# Patient Record
Sex: FEMALE | Race: WHITE | Employment: FULL TIME | URBAN - METROPOLITAN AREA
[De-identification: names, ages, dates, MRNs, and addresses within clinical notes are randomized per-mention and may not be internally consistent; named-entity substitution may affect disease eponyms.]

---

## 2017-07-19 PROBLEM — I47.1 PSVT (PAROXYSMAL SUPRAVENTRICULAR TACHYCARDIA) (HCC): Status: ACTIVE | Noted: 2017-07-19

## 2017-07-19 PROBLEM — N20.0 KIDNEY STONES: Status: ACTIVE | Noted: 2017-07-19

## 2017-07-19 PROBLEM — D44.7 PARAGANGLIOMA (HCC): Status: ACTIVE | Noted: 2017-07-19

## 2017-07-19 PROBLEM — F32.A DEPRESSION: Status: ACTIVE | Noted: 2017-07-19

## 2017-07-19 PROBLEM — J32.9 CHRONIC SINUSITIS: Status: ACTIVE | Noted: 2017-07-19

## 2017-10-02 ENCOUNTER — OFFICE VISIT (OUTPATIENT)
Dept: SURGERY | Age: 57
End: 2017-10-02

## 2017-10-02 VITALS
BODY MASS INDEX: 20.61 KG/M2 | OXYGEN SATURATION: 98 % | HEART RATE: 84 BPM | TEMPERATURE: 98.4 F | DIASTOLIC BLOOD PRESSURE: 80 MMHG | SYSTOLIC BLOOD PRESSURE: 128 MMHG | RESPIRATION RATE: 18 BRPM | WEIGHT: 136 LBS | HEIGHT: 68 IN

## 2017-10-02 DIAGNOSIS — K43.2 INCISIONAL HERNIA, WITHOUT OBSTRUCTION OR GANGRENE: Primary | ICD-10-CM

## 2017-10-02 RX ORDER — LORAZEPAM 0.5 MG/1
0.5 TABLET ORAL
COMMUNITY

## 2017-10-02 RX ORDER — VALACYCLOVIR HYDROCHLORIDE 500 MG/1
500 TABLET, FILM COATED ORAL DAILY
COMMUNITY

## 2017-10-02 RX ORDER — HYDROXYCHLOROQUINE SULFATE 200 MG/1
200 TABLET, FILM COATED ORAL 2 TIMES DAILY
COMMUNITY

## 2017-10-02 NOTE — MR AVS SNAPSHOT
Visit Information Date & Time Provider Department Dept. Phone Encounter #  
 10/2/2017  3:00 PM Danyelle Pari, [address] [phone] [id number] Follow-up Instructions Routing History Upcoming Health Maintenance Date Due Hepatitis C Screening 1960 Pneumococcal 19-64 Medium Risk (1 of 1 - PPSV23) 3/11/1980 DTaP/Tdap/Td series (1 - Tdap) 3/11/1982 PAP AKA CERVICAL CYTOLOGY 3/11/1982 FOBT Q 1 YEAR AGE 50-75 3/11/2011 INFLUENZA AGE 9 TO ADULT 8/1/2017 BREAST CANCER SCRN MAMMOGRAM 7/19/2018 Allergies as of 10/2/2017  Review Complete On: 10/2/2017 By: Danyelle Yañez MD  
  
 Severity Noted Reaction Type Reactions Amoxicillin  01/13/2012    Hives Ceftin [Cefuroxime Axetil]  01/13/2012    Rash Erythromycin  01/13/2012    Rash Keflex [Cephalexin]  01/13/2012    Hives Sulfa (Sulfonamide Antibiotics)  01/13/2012    Hives Current Immunizations  Never Reviewed No immunizations on file. Not reviewed this visit You Were Diagnosed With   
  
 Codes Comments Incisional hernia, without obstruction or gangrene    -  Primary ICD-10-CM: B34.1 ICD-9-CM: 553.21 Vitals BP Pulse Temp Resp Height(growth percentile) Weight(growth percentile) 128/80 (BP 1 Location: Right arm, BP Patient Position: Sitting) 84 98.4 °F (36.9 °C) (Oral) 18 5' 8\" (1.727 m) 136 lb (61.7 kg) LMP SpO2 BMI OB Status Smoking Status 01/13/2010 98% 20.68 kg/m2 Postmenopausal Current Every Day Smoker BMI and BSA Data Body Mass Index Body Surface Area  
 20.68 kg/m 2 1.72 m 2 Preferred Pharmacy Pharmacy Name Phone [facility] [phone] Your Updated Medication List  
  
   
This list is accurate as of: 10/2/17  4:46 PM.  Always use your most recent med list.  
  
  
  
  
 ACIDOPHILUS PO  
 Take  by mouth daily. aspirin delayed-release 81 mg tablet Take  by mouth daily. BYSTOLIC 2.5 mg tablet Generic drug:  nebivolol Take  by mouth daily. docusate sodium 100 mg capsule Commonly known as:  Lucetta Arias Take 100 mg by mouth every other day. doxycycline 100 mg capsule Commonly known as:  VIBRAMYCIN Take 1 Cap by mouth two (2) times a day. FISH -1,400 mg Cpdr  
Generic drug:  omega 3-dha-epa-fish oil Take 1,400 mg by mouth two (2) times a day. FLONASE 50 mcg/actuation nasal spray Generic drug:  fluticasone 2 Sprays by Both Nostrils route daily. levoFLOXacin 500 mg tablet Commonly known as:  Fernanda Motley Take 1 Tab by mouth daily. loratadine 10 mg tablet Commonly known as:  Ana Laura Draft Take 10 mg by mouth. LORazepam 0.5 mg tablet Commonly known as:  ATIVAN Take  by mouth.  
  
 magnesium oxide 400 mg tablet Commonly known as:  MAG-OX Take 400 mg by mouth daily. omeprazole 20 mg capsule Commonly known as:  PRILOSEC Take 20 mg by mouth daily. PLAQUENIL 200 mg tablet Generic drug:  hydroxychloroquine Take 200 mg by mouth daily. turmeric root extract 500 mg Cap Take  by mouth two (2) times a day. VALTREX 500 mg tablet Generic drug:  valACYclovir Take  by mouth two (2) times a day. VITAMIN D3 2,000 unit Tab Generic drug:  cholecalciferol (vitamin D3) Take 2,000 Units by mouth daily. XANAX PO Take 0.5 mg by mouth daily as needed. XYZAL 5 mg tablet Generic drug:  levocetirizine Take 5 mg by mouth every evening. Introducing \[facility]\"" & HEALTH SERVICES! Dear Katina Alonzo: 
Thank you for requesting a B-152 account. Our records indicate that you already have an active B-152 account. You can access your account anytime at [email or web address] Did you know that you can access your hospital and ER discharge instructions at any time in [employer]? You can also review all of your test results from your hospital stay or ER visit. Additional Information If you have questions, please visit the Frequently Asked Questions section of the [employer] website at [email or web address] Remember, [employer] is NOT to be used for urgent needs. For medical emergencies, dial 911. Now available from your iPhone and Android! Please provide this summary of care documentation to your next provider. Your primary care clinician is listed as Lizeth Acosta. If you have any questions after today's visit, please call [phone].

## 2017-10-02 NOTE — PROGRESS NOTES
1. Have you been to the ER, urgent care clinic since your last visit? Hospitalized since your last visit? No    2. Have you seen or consulted any other health care providers outside of the [address] [place] [address] since your last visit? Include any pap smears or colon screening.  Yes- Dr. Cristian Rosa who did surgery recently to remove tumor and lymph nodes from abdomen

## 2017-10-02 NOTE — PROGRESS NOTES
Surgery Consult    Subjective:      Dionte Pearson is a 65 y.o.  female who was referred by Dr Tamica Benoit for evaluation of possible hernia and surgery to remove mass. Pt is unsure as to what is going on right now. Pt thought she had a recurrent hernia which she doesn't since she has never had a hernia before. Report also said (according to ) that there was mesh present as well--which is impossible since she has never had surgery before. She was tested for genetic disorders and was positive for [facility]. Pt had a multitude of symptoms, during her recovery she was doing yard work and got lyme disease. Then she had achy joints, twitching of the face--went to a number of doctors for it. The stomach never stopped hurting, even now--although now it can be ascribed to the hernia. She went to the original surgeon last week to discuss her numerous allergies so she doesn't want the mesh but she definitely wants something to reinforce and prevent a recurrence. She mentioned that pig skin/cadaver skin is being used up at [facility].       Chief Complaint   Patient presents with    Incisional Hernia     'below belly button'       Patient Active Problem List    Diagnosis Date Noted    Incisional hernia, without obstruction or gangrene 10/02/2017    PSVT (paroxysmal supraventricular tachycardia) ([address] 07/19/2017    Chronic sinusitis 07/19/2017    Kidney stones 07/19/2017    Depression 07/19/2017    Paraganglioma ([address] 07/19/2017     Past Medical History:   Diagnosis Date    Chronic sinusitis 7/19/2017    Depression 7/19/2017    Hypercholesterolemia     Ill-defined condition     hematuria    Ill-defined condition     allergic rhinittis    Ill-defined condition     recurrent UTIs    Incisional hernia, without obstruction or gangrene 10/2/2017    Kidney stones 7/19/2017    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    [facility] MUTATION    PSVT (paroxysmal supraventricular tachycardia) ([address] 7/19/2017    Psychotic disorder anxiety    Unspecified sleep apnea     mouth guard      Past Surgical History:   Procedure Laterality Date    ABDOMEN SURGERY PROC UNLISTED  1998    benign tumor in stomach    HX CHOLECYSTECTOMY      HX HEENT  1999    sinus    HX HEENT      widom teeth      Social History   Substance Use Topics    Smoking status: Current Every Day Smoker     Packs/day: 1.00     Years: 30.00    Smokeless tobacco: Never Used    Alcohol use 2.5 oz/week     5 Glasses of wine per week      Family History   Problem Relation Age of Onset    Elevated Lipids Sister     Breast Cancer Sister 52    Cancer Sister 48     breast cancer    Elevated Lipids Sister     Cancer Sister 50     Breast cancer    Elevated Lipids Sister     Elevated Lipids Mother     Cancer Father      bladder, prostate    Diabetes Father     Elevated Lipids Sister     Elevated Lipids Sister     Breast Cancer Maternal Aunt      had it twice last 79    Breast Cancer Paternal Aunt       Current Outpatient Prescriptions   Medication Sig    hydroxychloroquine (PLAQUENIL) 200 mg tablet Take 200 mg by mouth daily.  valACYclovir (VALTREX) 500 mg tablet Take  by mouth two (2) times a day.  LORazepam (ATIVAN) 0.5 mg tablet Take  by mouth.  LACTOBACILLUS ACIDOPHILUS (ACIDOPHILUS PO) Take  by mouth daily.  cholecalciferol, vitamin D3, (VITAMIN D3) 2,000 unit tab Take 2,000 Units by mouth daily.  loratadine (CLARITIN) 10 mg tablet Take 10 mg by mouth.  fluticasone (FLONASE) 50 mcg/actuation nasal spray 2 Sprays by Both Nostrils route daily.  aspirin delayed-release 81 mg tablet Take  by mouth daily.  doxycycline (VIBRAMYCIN) 100 mg capsule Take 1 Cap by mouth two (2) times a day.  nebivolol (BYSTOLIC) 2.5 mg tablet Take  by mouth daily.  omega 3-dha-epa-fish oil (FISH OIL) 900-1,400 mg cpDR Take 1,400 mg by mouth two (2) times a day.  magnesium oxide (MAG-OX) 400 mg tablet Take 400 mg by mouth daily.     docusate sodium (COLACE) 100 mg capsule Take 100 mg by mouth every other day.  turmeric root extract 500 mg cap Take  by mouth two (2) times a day.  omeprazole (PRILOSEC) 20 mg capsule Take 20 mg by mouth daily.  levoFLOXacin (LEVAQUIN) 500 mg tablet Take 1 Tab by mouth daily.  levocetirizine (XYZAL) 5 mg tablet Take 5 mg by mouth every evening.  ALPRAZOLAM (XANAX PO) Take 0.5 mg by mouth daily as needed. No current facility-administered medications for this visit. Allergies   Allergen Reactions    Amoxicillin Hives    Ceftin [Cefuroxime Axetil] Rash    Erythromycin Rash    Keflex [Cephalexin] Hives    Sulfa (Sulfonamide Antibiotics) Hives        Review of Systems:    A comprehensive review of systems was negative except for that written in the History of Present Illness. Objective:     Visit Vitals    /80 (BP 1 Location: Right arm, BP Patient Position: Sitting)    Pulse 84    Temp 98.4 °F (36.9 °C) (Oral)    Resp 18    Ht 5' 8\" (1.727 m)    Wt 136 lb (61.7 kg)    LMP 01/13/2010    SpO2 98%    BMI 20.68 kg/m2         Physical Exam:  General appearance: alert, cooperative, no distress, appears stated age  Head: Normocephalic, without obvious abnormality, atraumatic  Lungs: clear to auscultation bilaterally  Heart: regular rate and rhythm, S1, S2 normal, no murmur, click, rub or gallop  Neurologic: Grossly normal  Abdomen: Has a 3 cm defect just to the right of the umbilicus from her midline incision. There are no masses. No organomegaly. Assessment:       ICD-10-CM ICD-9-CM    1. Incisional hernia, without obstruction or gangrene K43.2 553.21          Plan:     Repair hernia using absorbable mesh as an outpatient. Written by marcelino Dejesus, as dictated by Karina Tripathi MD.    Total face to face time with patient: 25 minutes. Greater than 50% of the time was spent in counseling. Signed By: Karina Tripathi MD     October 2, 2017

## 2017-10-12 ENCOUNTER — HOSPITAL ENCOUNTER (OUTPATIENT)
Dept: PREADMISSION TESTING | Age: 57
Discharge: HOME | End: 2017-10-12
Payer: COMMERCIAL

## 2017-10-12 ENCOUNTER — DOCUMENTATION ONLY (OUTPATIENT)
Dept: SURGERY | Age: 57
End: 2017-10-12

## 2017-10-12 ENCOUNTER — TELEPHONE (OUTPATIENT)
Dept: SURGERY | Age: 57
End: 2017-10-12

## 2017-10-12 VITALS
BODY MASS INDEX: 5.32 KG/M2 | HEIGHT: 68 IN | RESPIRATION RATE: 18 BRPM | DIASTOLIC BLOOD PRESSURE: 82 MMHG | HEART RATE: 77 BPM | SYSTOLIC BLOOD PRESSURE: 121 MMHG | WEIGHT: 35.13 LBS | OXYGEN SATURATION: 99 % | TEMPERATURE: 98.1 F

## 2017-10-12 LAB
ANION GAP SERPL CALC-SCNC: 8 MMOL/L (ref 5–15)
BASOPHILS # BLD: 0.1 K/UL (ref 0–0.1)
BASOPHILS NFR BLD: 1 % (ref 0–1)
BUN SERPL-MCNC: 8 MG/DL (ref 6–20)
BUN/CREAT SERPL: 12 (ref 12–20)
CALCIUM SERPL-MCNC: 9.3 MG/DL (ref 8.5–10.1)
CHLORIDE SERPL-SCNC: 107 MMOL/L (ref 97–108)
CO2 SERPL-SCNC: 28 MMOL/L (ref 21–32)
CREAT SERPL-MCNC: 0.68 MG/DL (ref 0.55–1.02)
EOSINOPHIL # BLD: 0.2 K/UL (ref 0–0.4)
EOSINOPHIL NFR BLD: 3 % (ref 0–7)
ERYTHROCYTE [DISTWIDTH] IN BLOOD BY AUTOMATED COUNT: 14.6 % (ref 11.5–14.5)
GLUCOSE SERPL-MCNC: 120 MG/DL (ref 65–100)
HCT VFR BLD AUTO: 39.7 % (ref 35–47)
HGB BLD-MCNC: 13.2 G/DL (ref 11.5–16)
LYMPHOCYTES # BLD: 1.3 K/UL (ref 0.8–3.5)
LYMPHOCYTES NFR BLD: 17 % (ref 12–49)
MCH RBC QN AUTO: 29.7 PG (ref 26–34)
MCHC RBC AUTO-ENTMCNC: 33.2 G/DL (ref 30–36.5)
MCV RBC AUTO: 89.4 FL (ref 80–99)
MONOCYTES # BLD: 0.6 K/UL (ref 0–1)
MONOCYTES NFR BLD: 8 % (ref 5–13)
NEUTS SEG # BLD: 5.2 K/UL (ref 1.8–8)
NEUTS SEG NFR BLD: 71 % (ref 32–75)
PLATELET # BLD AUTO: 266 K/UL (ref 150–400)
POTASSIUM SERPL-SCNC: 3.8 MMOL/L (ref 3.5–5.1)
RBC # BLD AUTO: 4.44 M/UL (ref 3.8–5.2)
SODIUM SERPL-SCNC: 143 MMOL/L (ref 136–145)
WBC # BLD AUTO: 7.3 K/UL (ref 3.6–11)

## 2017-10-12 PROCEDURE — 36415 COLL VENOUS BLD VENIPUNCTURE: CPT | Performed by: SURGERY

## 2017-10-12 PROCEDURE — 80048 BASIC METABOLIC PNL TOTAL CA: CPT | Performed by: SURGERY

## 2017-10-12 PROCEDURE — 85025 COMPLETE CBC W/AUTO DIFF WBC: CPT | Performed by: SURGERY

## 2017-10-12 RX ORDER — LANOLIN ALCOHOL/MO/W.PET/CERES
1000 CREAM (GRAM) TOPICAL DAILY
COMMUNITY

## 2017-10-12 RX ORDER — ASPIRIN 325 MG
TABLET, DELAYED RELEASE (ENTERIC COATED) ORAL
COMMUNITY

## 2017-10-12 RX ORDER — IBUPROFEN 200 MG
1 TABLET ORAL EVERY 24 HOURS
COMMUNITY

## 2017-10-12 RX ORDER — PRAVASTATIN SODIUM 40 MG/1
40 TABLET ORAL
COMMUNITY

## 2017-10-12 RX ORDER — FENOFIBRATE 160 MG/1
160 TABLET ORAL DAILY
COMMUNITY

## 2017-10-12 NOTE — PROGRESS NOTES
I spoke with Dr. Alexys Ernandez about patient asking about using mesh vs Pig Skin for her hernia repair. He said he was going to discuss it with some of the other physicians in the [address] and get back with me or the patient. I called the patient back and left a message letting her know this information.

## 2017-10-12 NOTE — TELEPHONE ENCOUNTER
gp: 10-19-17 repair inc. hernia w mesh    Patient was approved for mesh but wants to know if she can get pig skin instead.

## 2017-10-12 NOTE — TELEPHONE ENCOUNTER
Patient called back and said she was not sure she wanted to have the surgery if pig skin could not be used instead of mesh. She also states she saw her liver specialist Dr. Angel Cadena today and Dr. Angel Cadena was asking if Dr. Migue Inman could do a liver biopsy at the time of surgery because the one she had in Feb 'was not a good one'. I told her I would ask Dr. Migue Inman if he could call her to discuss these issues.

## 2017-10-13 ENCOUNTER — DOCUMENTATION ONLY (OUTPATIENT)
Dept: SURGERY | Age: 57
End: 2017-10-13

## 2017-10-13 ENCOUNTER — TELEPHONE (OUTPATIENT)
Dept: SURGERY | Age: 57
End: 2017-10-13

## 2017-10-13 NOTE — PROGRESS NOTES
EKG AND CARDIO NOTES FAXED  Received: Today       VENANCIO Roman LPN                     PLEASE NOTE:  FAXED EKG AND CARDIO NOTES. Esmer Amador       This information was received and I put it on Dr. Kym Whitfield despreeti in  for review.

## 2017-10-13 NOTE — TELEPHONE ENCOUNTER
I returned patients call and told that Dr. Szymanski Oscar allows his patients to continue their 81mg ASA prior to surgery.

## 2017-10-17 ENCOUNTER — TELEPHONE (OUTPATIENT)
Dept: SURGERY | Age: 57
End: 2017-10-17

## 2017-10-17 NOTE — TELEPHONE ENCOUNTER
Discussed the [place] mesh and she is in agreement with that. Also wants a liver biopsy to fully evaluate her elevated LFTs. Dr. Juanjose easton at [address] [place] [address] is desirous of seeing the slides. Will also use Exparel after the surgery to aid in pain relief.

## 2017-10-19 ENCOUNTER — HOSPITAL ENCOUNTER (INPATIENT)
Age: 57
LOS: 4 days | Discharge: HOME | DRG: 355 | End: 2017-10-23
Attending: SURGERY | Admitting: SURGERY
Payer: COMMERCIAL

## 2017-10-19 ENCOUNTER — ANESTHESIA EVENT (OUTPATIENT)
Dept: SURGERY | Age: 57
DRG: 355 | End: 2017-10-19
Payer: COMMERCIAL

## 2017-10-19 ENCOUNTER — ANESTHESIA (OUTPATIENT)
Dept: SURGERY | Age: 57
DRG: 355 | End: 2017-10-19
Payer: COMMERCIAL

## 2017-10-19 PROBLEM — K43.2 INCISIONAL HERNIA: Status: ACTIVE | Noted: 2017-10-19

## 2017-10-19 PROCEDURE — 77030020269 HC MISC IMPL: Performed by: SURGERY

## 2017-10-19 PROCEDURE — 74011250636 HC RX REV CODE- 250/636

## 2017-10-19 PROCEDURE — 0FB03ZX EXCISION OF LIVER, PERCUTANEOUS APPROACH, DIAGNOSTIC: ICD-10-PCS | Performed by: SURGERY

## 2017-10-19 PROCEDURE — 74011250636 HC RX REV CODE- 250/636: Performed by: SURGERY

## 2017-10-19 PROCEDURE — 0WUF0JZ SUPPLEMENT ABDOMINAL WALL WITH SYNTHETIC SUBSTITUTE, OPEN APPROACH: ICD-10-PCS | Performed by: SURGERY

## 2017-10-19 PROCEDURE — 88313 SPECIAL STAINS GROUP 2: CPT | Performed by: SURGERY

## 2017-10-19 PROCEDURE — 74011000250 HC RX REV CODE- 250

## 2017-10-19 PROCEDURE — 76060000035 HC ANESTHESIA 2 TO 2.5 HR: Performed by: SURGERY

## 2017-10-19 PROCEDURE — 77030034698 HC LIGASURE MRYLND OPN SEAL DIV COVD -F: Performed by: SURGERY

## 2017-10-19 PROCEDURE — 76210000017 HC OR PH I REC 1.5 TO 2 HR: Performed by: SURGERY

## 2017-10-19 PROCEDURE — 87205 SMEAR GRAM STAIN: CPT | Performed by: SURGERY

## 2017-10-19 PROCEDURE — 74011250636 HC RX REV CODE- 250/636: Performed by: ANESTHESIOLOGY

## 2017-10-19 PROCEDURE — 77030002933 HC SUT MCRYL J&J -A: Performed by: SURGERY

## 2017-10-19 PROCEDURE — C9290 INJ, BUPIVACAINE LIPOSOME: HCPCS | Performed by: SURGERY

## 2017-10-19 PROCEDURE — 74011250637 HC RX REV CODE- 250/637: Performed by: SURGERY

## 2017-10-19 PROCEDURE — 77030008684 HC TU ET CUF COVD -B: Performed by: ANESTHESIOLOGY

## 2017-10-19 PROCEDURE — 77030018836 HC SOL IRR NACL ICUM -A: Performed by: SURGERY

## 2017-10-19 PROCEDURE — 65210000002 HC RM PRIVATE GYN

## 2017-10-19 PROCEDURE — 77030010507 HC ADH SKN DERMBND J&J -B: Performed by: SURGERY

## 2017-10-19 PROCEDURE — 76010000131 HC OR TIME 2 TO 2.5 HR: Performed by: SURGERY

## 2017-10-19 PROCEDURE — 87075 CULTR BACTERIA EXCEPT BLOOD: CPT | Performed by: SURGERY

## 2017-10-19 PROCEDURE — 77030032490 HC SLV COMPR SCD KNE COVD -B: Performed by: SURGERY

## 2017-10-19 PROCEDURE — 77030002895 HC DEV VASC CLOSR COVD -B: Performed by: SURGERY

## 2017-10-19 PROCEDURE — 77030013079 HC BLNKT BAIR HGGR 3M -A: Performed by: ANESTHESIOLOGY

## 2017-10-19 PROCEDURE — 77030011640 HC PAD GRND REM COVD -A: Performed by: SURGERY

## 2017-10-19 PROCEDURE — 77030031139 HC SUT VCRL2 J&J -A: Performed by: SURGERY

## 2017-10-19 PROCEDURE — 77030018846 HC SOL IRR STRL H20 ICUM -A: Performed by: SURGERY

## 2017-10-19 PROCEDURE — 77030002966 HC SUT PDS J&J -A: Performed by: SURGERY

## 2017-10-19 RX ORDER — LEVOFLOXACIN 5 MG/ML
500 INJECTION, SOLUTION INTRAVENOUS ONCE
Status: COMPLETED | OUTPATIENT
Start: 2017-10-19 | End: 2017-10-19

## 2017-10-19 RX ORDER — LIDOCAINE HYDROCHLORIDE 20 MG/ML
INJECTION, SOLUTION EPIDURAL; INFILTRATION; INTRACAUDAL; PERINEURAL AS NEEDED
Status: DISCONTINUED | OUTPATIENT
Start: 2017-10-19 | End: 2017-10-19 | Stop reason: HOSPADM

## 2017-10-19 RX ORDER — SODIUM CHLORIDE 9 MG/ML
25 INJECTION, SOLUTION INTRAVENOUS CONTINUOUS
Status: DISCONTINUED | OUTPATIENT
Start: 2017-10-19 | End: 2017-10-19 | Stop reason: HOSPADM

## 2017-10-19 RX ORDER — SODIUM CHLORIDE 0.9 % (FLUSH) 0.9 %
5-10 SYRINGE (ML) INJECTION AS NEEDED
Status: DISCONTINUED | OUTPATIENT
Start: 2017-10-19 | End: 2017-10-23 | Stop reason: HOSPADM

## 2017-10-19 RX ORDER — HYDROMORPHONE HYDROCHLORIDE 1 MG/ML
0.2 INJECTION, SOLUTION INTRAMUSCULAR; INTRAVENOUS; SUBCUTANEOUS
Status: DISCONTINUED | OUTPATIENT
Start: 2017-10-19 | End: 2017-10-19 | Stop reason: HOSPADM

## 2017-10-19 RX ORDER — KETOROLAC TROMETHAMINE 30 MG/ML
INJECTION, SOLUTION INTRAMUSCULAR; INTRAVENOUS AS NEEDED
Status: DISCONTINUED | OUTPATIENT
Start: 2017-10-19 | End: 2017-10-19 | Stop reason: HOSPADM

## 2017-10-19 RX ORDER — HYDROMORPHONE HCL IN 0.9% NACL 15 MG/30ML
PATIENT CONTROLLED ANALGESIA VIAL INTRAVENOUS
Status: DISCONTINUED | OUTPATIENT
Start: 2017-10-19 | End: 2017-10-21

## 2017-10-19 RX ORDER — DEXTROSE, SODIUM CHLORIDE, AND POTASSIUM CHLORIDE 5; .45; .15 G/100ML; G/100ML; G/100ML
75 INJECTION INTRAVENOUS CONTINUOUS
Status: DISPENSED | OUTPATIENT
Start: 2017-10-19 | End: 2017-10-20

## 2017-10-19 RX ORDER — PROPOFOL 10 MG/ML
INJECTION, EMULSION INTRAVENOUS AS NEEDED
Status: DISCONTINUED | OUTPATIENT
Start: 2017-10-19 | End: 2017-10-19 | Stop reason: HOSPADM

## 2017-10-19 RX ORDER — MIDAZOLAM HYDROCHLORIDE 1 MG/ML
1 INJECTION, SOLUTION INTRAMUSCULAR; INTRAVENOUS AS NEEDED
Status: DISCONTINUED | OUTPATIENT
Start: 2017-10-19 | End: 2017-10-19 | Stop reason: HOSPADM

## 2017-10-19 RX ORDER — ONDANSETRON 2 MG/ML
4 INJECTION INTRAMUSCULAR; INTRAVENOUS
Status: DISCONTINUED | OUTPATIENT
Start: 2017-10-19 | End: 2017-10-23 | Stop reason: HOSPADM

## 2017-10-19 RX ORDER — NEBIVOLOL 2.5 MG/1
2.5 TABLET ORAL
Status: DISCONTINUED | OUTPATIENT
Start: 2017-10-19 | End: 2017-10-23 | Stop reason: HOSPADM

## 2017-10-19 RX ORDER — ROPIVACAINE HYDROCHLORIDE 5 MG/ML
30 INJECTION, SOLUTION EPIDURAL; INFILTRATION; PERINEURAL ONCE
Status: DISCONTINUED | OUTPATIENT
Start: 2017-10-19 | End: 2017-10-19 | Stop reason: HOSPADM

## 2017-10-19 RX ORDER — SODIUM CHLORIDE 0.9 % (FLUSH) 0.9 %
5-10 SYRINGE (ML) INJECTION AS NEEDED
Status: DISCONTINUED | OUTPATIENT
Start: 2017-10-19 | End: 2017-10-19 | Stop reason: HOSPADM

## 2017-10-19 RX ORDER — FENTANYL CITRATE 50 UG/ML
25 INJECTION, SOLUTION INTRAMUSCULAR; INTRAVENOUS
Status: DISCONTINUED | OUTPATIENT
Start: 2017-10-19 | End: 2017-10-19 | Stop reason: HOSPADM

## 2017-10-19 RX ORDER — ACETAMINOPHEN 325 MG/1
650 TABLET ORAL
Status: DISCONTINUED | OUTPATIENT
Start: 2017-10-19 | End: 2017-10-23 | Stop reason: HOSPADM

## 2017-10-19 RX ORDER — SODIUM CHLORIDE 0.9 % (FLUSH) 0.9 %
5-10 SYRINGE (ML) INJECTION EVERY 8 HOURS
Status: DISCONTINUED | OUTPATIENT
Start: 2017-10-19 | End: 2017-10-23 | Stop reason: HOSPADM

## 2017-10-19 RX ORDER — MIDAZOLAM HYDROCHLORIDE 1 MG/ML
0.5 INJECTION, SOLUTION INTRAMUSCULAR; INTRAVENOUS
Status: DISCONTINUED | OUTPATIENT
Start: 2017-10-19 | End: 2017-10-19 | Stop reason: HOSPADM

## 2017-10-19 RX ORDER — ONDANSETRON 2 MG/ML
4 INJECTION INTRAMUSCULAR; INTRAVENOUS AS NEEDED
Status: DISCONTINUED | OUTPATIENT
Start: 2017-10-19 | End: 2017-10-19 | Stop reason: HOSPADM

## 2017-10-19 RX ORDER — VALACYCLOVIR HYDROCHLORIDE 500 MG/1
500 TABLET, FILM COATED ORAL DAILY
Status: DISCONTINUED | OUTPATIENT
Start: 2017-10-20 | End: 2017-10-23 | Stop reason: HOSPADM

## 2017-10-19 RX ORDER — LIDOCAINE HYDROCHLORIDE 10 MG/ML
0.1 INJECTION, SOLUTION EPIDURAL; INFILTRATION; INTRACAUDAL; PERINEURAL AS NEEDED
Status: DISCONTINUED | OUTPATIENT
Start: 2017-10-19 | End: 2017-10-19 | Stop reason: HOSPADM

## 2017-10-19 RX ORDER — NEOSTIGMINE METHYLSULFATE 1 MG/ML
INJECTION INTRAVENOUS AS NEEDED
Status: DISCONTINUED | OUTPATIENT
Start: 2017-10-19 | End: 2017-10-19 | Stop reason: HOSPADM

## 2017-10-19 RX ORDER — ASPIRIN 81 MG/1
81 TABLET ORAL DAILY
Status: DISCONTINUED | OUTPATIENT
Start: 2017-10-20 | End: 2017-10-23 | Stop reason: HOSPADM

## 2017-10-19 RX ORDER — ONDANSETRON 2 MG/ML
INJECTION INTRAMUSCULAR; INTRAVENOUS AS NEEDED
Status: DISCONTINUED | OUTPATIENT
Start: 2017-10-19 | End: 2017-10-19 | Stop reason: HOSPADM

## 2017-10-19 RX ORDER — FENTANYL CITRATE 50 UG/ML
INJECTION, SOLUTION INTRAMUSCULAR; INTRAVENOUS AS NEEDED
Status: DISCONTINUED | OUTPATIENT
Start: 2017-10-19 | End: 2017-10-19 | Stop reason: HOSPADM

## 2017-10-19 RX ORDER — ROCURONIUM BROMIDE 10 MG/ML
INJECTION, SOLUTION INTRAVENOUS AS NEEDED
Status: DISCONTINUED | OUTPATIENT
Start: 2017-10-19 | End: 2017-10-19 | Stop reason: HOSPADM

## 2017-10-19 RX ORDER — MIDAZOLAM HYDROCHLORIDE 1 MG/ML
INJECTION, SOLUTION INTRAMUSCULAR; INTRAVENOUS AS NEEDED
Status: DISCONTINUED | OUTPATIENT
Start: 2017-10-19 | End: 2017-10-19 | Stop reason: HOSPADM

## 2017-10-19 RX ORDER — HYDROXYCHLOROQUINE SULFATE 200 MG/1
200 TABLET, FILM COATED ORAL 2 TIMES DAILY
Status: DISCONTINUED | OUTPATIENT
Start: 2017-10-19 | End: 2017-10-23 | Stop reason: HOSPADM

## 2017-10-19 RX ORDER — SODIUM CHLORIDE 0.9 % (FLUSH) 0.9 %
5-10 SYRINGE (ML) INJECTION EVERY 8 HOURS
Status: DISCONTINUED | OUTPATIENT
Start: 2017-10-19 | End: 2017-10-19 | Stop reason: HOSPADM

## 2017-10-19 RX ORDER — ENOXAPARIN SODIUM 100 MG/ML
40 INJECTION SUBCUTANEOUS EVERY 24 HOURS
Status: DISCONTINUED | OUTPATIENT
Start: 2017-10-19 | End: 2017-10-23 | Stop reason: HOSPADM

## 2017-10-19 RX ORDER — MORPHINE SULFATE 10 MG/ML
2 INJECTION, SOLUTION INTRAMUSCULAR; INTRAVENOUS
Status: DISCONTINUED | OUTPATIENT
Start: 2017-10-19 | End: 2017-10-19 | Stop reason: HOSPADM

## 2017-10-19 RX ORDER — SODIUM CHLORIDE, SODIUM LACTATE, POTASSIUM CHLORIDE, CALCIUM CHLORIDE 600; 310; 30; 20 MG/100ML; MG/100ML; MG/100ML; MG/100ML
INJECTION, SOLUTION INTRAVENOUS
Status: DISCONTINUED | OUTPATIENT
Start: 2017-10-19 | End: 2017-10-19 | Stop reason: HOSPADM

## 2017-10-19 RX ORDER — SODIUM CHLORIDE, SODIUM LACTATE, POTASSIUM CHLORIDE, CALCIUM CHLORIDE 600; 310; 30; 20 MG/100ML; MG/100ML; MG/100ML; MG/100ML
75 INJECTION, SOLUTION INTRAVENOUS CONTINUOUS
Status: DISCONTINUED | OUTPATIENT
Start: 2017-10-19 | End: 2017-10-19 | Stop reason: HOSPADM

## 2017-10-19 RX ORDER — DEXAMETHASONE SODIUM PHOSPHATE 4 MG/ML
INJECTION, SOLUTION INTRA-ARTICULAR; INTRALESIONAL; INTRAMUSCULAR; INTRAVENOUS; SOFT TISSUE AS NEEDED
Status: DISCONTINUED | OUTPATIENT
Start: 2017-10-19 | End: 2017-10-19 | Stop reason: HOSPADM

## 2017-10-19 RX ORDER — SODIUM CHLORIDE, SODIUM LACTATE, POTASSIUM CHLORIDE, CALCIUM CHLORIDE 600; 310; 30; 20 MG/100ML; MG/100ML; MG/100ML; MG/100ML
125 INJECTION, SOLUTION INTRAVENOUS CONTINUOUS
Status: DISCONTINUED | OUTPATIENT
Start: 2017-10-19 | End: 2017-10-19 | Stop reason: HOSPADM

## 2017-10-19 RX ORDER — SUCCINYLCHOLINE CHLORIDE 20 MG/ML
INJECTION INTRAMUSCULAR; INTRAVENOUS AS NEEDED
Status: DISCONTINUED | OUTPATIENT
Start: 2017-10-19 | End: 2017-10-19 | Stop reason: HOSPADM

## 2017-10-19 RX ORDER — DIPHENHYDRAMINE HYDROCHLORIDE 50 MG/ML
12.5 INJECTION, SOLUTION INTRAMUSCULAR; INTRAVENOUS
Status: DISCONTINUED | OUTPATIENT
Start: 2017-10-19 | End: 2017-10-23 | Stop reason: HOSPADM

## 2017-10-19 RX ORDER — GLYCOPYRROLATE 0.2 MG/ML
INJECTION INTRAMUSCULAR; INTRAVENOUS AS NEEDED
Status: DISCONTINUED | OUTPATIENT
Start: 2017-10-19 | End: 2017-10-19 | Stop reason: HOSPADM

## 2017-10-19 RX ORDER — PHENYLEPHRINE HCL IN 0.9% NACL 0.4MG/10ML
SYRINGE (ML) INTRAVENOUS AS NEEDED
Status: DISCONTINUED | OUTPATIENT
Start: 2017-10-19 | End: 2017-10-19 | Stop reason: HOSPADM

## 2017-10-19 RX ORDER — FENTANYL CITRATE 50 UG/ML
50 INJECTION, SOLUTION INTRAMUSCULAR; INTRAVENOUS AS NEEDED
Status: DISCONTINUED | OUTPATIENT
Start: 2017-10-19 | End: 2017-10-19 | Stop reason: HOSPADM

## 2017-10-19 RX ORDER — DIPHENHYDRAMINE HYDROCHLORIDE 50 MG/ML
12.5 INJECTION, SOLUTION INTRAMUSCULAR; INTRAVENOUS AS NEEDED
Status: DISCONTINUED | OUTPATIENT
Start: 2017-10-19 | End: 2017-10-19 | Stop reason: HOSPADM

## 2017-10-19 RX ADMIN — FENTANYL CITRATE 25 MCG: 50 INJECTION, SOLUTION INTRAMUSCULAR; INTRAVENOUS at 15:15

## 2017-10-19 RX ADMIN — FENTANYL CITRATE 50 MCG: 50 INJECTION, SOLUTION INTRAMUSCULAR; INTRAVENOUS at 12:50

## 2017-10-19 RX ADMIN — Medication 80 MCG: at 12:35

## 2017-10-19 RX ADMIN — ENOXAPARIN SODIUM 40 MG: 40 INJECTION SUBCUTANEOUS at 16:51

## 2017-10-19 RX ADMIN — NEOSTIGMINE METHYLSULFATE 3 MG: 1 INJECTION INTRAVENOUS at 14:13

## 2017-10-19 RX ADMIN — Medication 10 ML: at 22:25

## 2017-10-19 RX ADMIN — Medication 10 ML: at 16:54

## 2017-10-19 RX ADMIN — LIDOCAINE HYDROCHLORIDE 60 MG: 20 INJECTION, SOLUTION EPIDURAL; INFILTRATION; INTRACAUDAL; PERINEURAL at 12:18

## 2017-10-19 RX ADMIN — GLYCOPYRROLATE 0.4 MG: 0.2 INJECTION INTRAMUSCULAR; INTRAVENOUS at 14:13

## 2017-10-19 RX ADMIN — ROCURONIUM BROMIDE 25 MG: 10 INJECTION, SOLUTION INTRAVENOUS at 12:32

## 2017-10-19 RX ADMIN — MIDAZOLAM HYDROCHLORIDE 0.5 MG: 1 INJECTION, SOLUTION INTRAMUSCULAR; INTRAVENOUS at 14:35

## 2017-10-19 RX ADMIN — Medication 80 MCG: at 12:23

## 2017-10-19 RX ADMIN — GLYCOPYRROLATE 0.2 MG: 0.2 INJECTION INTRAMUSCULAR; INTRAVENOUS at 12:35

## 2017-10-19 RX ADMIN — FENTANYL CITRATE 100 MCG: 50 INJECTION, SOLUTION INTRAMUSCULAR; INTRAVENOUS at 12:18

## 2017-10-19 RX ADMIN — DIPHENHYDRAMINE HYDROCHLORIDE 12.5 MG: 50 INJECTION, SOLUTION INTRAMUSCULAR; INTRAVENOUS at 23:40

## 2017-10-19 RX ADMIN — DIPHENHYDRAMINE HYDROCHLORIDE 12.5 MG: 50 INJECTION, SOLUTION INTRAMUSCULAR; INTRAVENOUS at 16:51

## 2017-10-19 RX ADMIN — MIDAZOLAM HYDROCHLORIDE 0.5 MG: 1 INJECTION, SOLUTION INTRAMUSCULAR; INTRAVENOUS at 14:30

## 2017-10-19 RX ADMIN — Medication: at 23:41

## 2017-10-19 RX ADMIN — MIDAZOLAM HYDROCHLORIDE 2 MG: 1 INJECTION, SOLUTION INTRAMUSCULAR; INTRAVENOUS at 12:11

## 2017-10-19 RX ADMIN — FENTANYL CITRATE 25 MCG: 50 INJECTION, SOLUTION INTRAMUSCULAR; INTRAVENOUS at 14:35

## 2017-10-19 RX ADMIN — HYDROXYCHLOROQUINE SULFATE 200 MG: 200 TABLET, FILM COATED ORAL at 18:28

## 2017-10-19 RX ADMIN — SODIUM CHLORIDE, SODIUM LACTATE, POTASSIUM CHLORIDE, AND CALCIUM CHLORIDE 125 ML/HR: 600; 310; 30; 20 INJECTION, SOLUTION INTRAVENOUS at 11:48

## 2017-10-19 RX ADMIN — SUCCINYLCHOLINE CHLORIDE 120 MG: 20 INJECTION INTRAMUSCULAR; INTRAVENOUS at 12:18

## 2017-10-19 RX ADMIN — SODIUM CHLORIDE, SODIUM LACTATE, POTASSIUM CHLORIDE, CALCIUM CHLORIDE: 600; 310; 30; 20 INJECTION, SOLUTION INTRAVENOUS at 12:14

## 2017-10-19 RX ADMIN — ROCURONIUM BROMIDE 5 MG: 10 INJECTION, SOLUTION INTRAVENOUS at 12:18

## 2017-10-19 RX ADMIN — KETOROLAC TROMETHAMINE 30 MG: 30 INJECTION, SOLUTION INTRAMUSCULAR; INTRAVENOUS at 13:59

## 2017-10-19 RX ADMIN — LEVOFLOXACIN 500 MG: 5 INJECTION, SOLUTION INTRAVENOUS at 12:23

## 2017-10-19 RX ADMIN — FENTANYL CITRATE 25 MCG: 50 INJECTION, SOLUTION INTRAMUSCULAR; INTRAVENOUS at 14:30

## 2017-10-19 RX ADMIN — DEXTROSE MONOHYDRATE, SODIUM CHLORIDE, AND POTASSIUM CHLORIDE 75 ML/HR: 50; 4.5; 1.49 INJECTION, SOLUTION INTRAVENOUS at 15:29

## 2017-10-19 RX ADMIN — Medication: at 15:30

## 2017-10-19 RX ADMIN — DEXAMETHASONE SODIUM PHOSPHATE 4 MG: 4 INJECTION, SOLUTION INTRA-ARTICULAR; INTRALESIONAL; INTRAMUSCULAR; INTRAVENOUS; SOFT TISSUE at 14:00

## 2017-10-19 RX ADMIN — PROPOFOL 120 MG: 10 INJECTION, EMULSION INTRAVENOUS at 12:18

## 2017-10-19 RX ADMIN — ONDANSETRON 4 MG: 2 INJECTION INTRAMUSCULAR; INTRAVENOUS at 14:00

## 2017-10-19 NOTE — ROUTINE PROCESS
Patient: Tita Calle MRN: [id number]  SSN: [id number]   YOB: 1960  Age: 65 y.o. Sex: female     Patient is status post Procedure(s):  REPAIR INCISIONAL HERNIA WITH ABSORBABLE MESH AND LIVER BIOPSY. Surgeon(s) and Role:     * Houston Quinn MD - Primary    Local/Dose/Irrigation: See [facility]                  Peripheral IV 10/19/17 Right Hand (Active)   Site Assessment Clean, dry, & intact 10/19/2017 11:46 AM   Phlebitis Assessment 0 10/19/2017 11:46 AM   Infiltration Assessment 0 10/19/2017 11:46 AM   Dressing Status Clean, dry, & intact 10/19/2017 11:46 AM   Dressing Type Tape;Transparent 10/19/2017 11:46 AM   Hub Color/Line Status Pink; Infusing;Patent 10/19/2017 11:46 AM            Airway - Endotracheal Tube 10/19/17 Oral (Active)                   Dressing/Packing:  Wound Abdomen Mid-DRESSING TYPE: Topical skin adhesive/glue (10/19/17 1300)  Splint/Cast:  ]    Other:

## 2017-10-19 NOTE — ANESTHESIA PREPROCEDURE EVALUATION
Anesthetic History   No history of anesthetic complications            Review of Systems / Medical History  Patient summary reviewed and pertinent labs reviewed    Pulmonary        Sleep apnea  Smoker  Asthma        Neuro/Psych         Psychiatric history     Cardiovascular    Hypertension        Dysrhythmias       Exercise tolerance: >4 METS  Comments: \"extra beats\"   GI/Hepatic/Renal           Liver disease     Endo/Other  Within defined limits           Other Findings              Physical Exam    Airway  Mallampati: III  TM Distance: > 6 cm  Neck ROM: normal range of motion   Mouth opening: Normal     Cardiovascular  Regular rate and rhythm,  S1 and S2 normal,  no murmur, click, rub, or gallop             Dental  No notable dental hx       Pulmonary  Breath sounds clear to auscultation               Abdominal  GI exam deferred       Other Findings            Anesthetic Plan    ASA: 3  Anesthesia type: general          Induction: Intravenous  Anesthetic plan and risks discussed with: Patient

## 2017-10-19 NOTE — H&P (VIEW-ONLY)
Surgery Consult    Subjective:      Lis Hutchins is a 65 y.o.  female who was referred by Dr Diego Ascencio for evaluation of possible hernia and surgery to remove mass. Pt is unsure as to what is going on right now. Pt thought she had a recurrent hernia which she doesn't since she has never had a hernia before. Report also said (according to ) that there was mesh present as well--which is impossible since she has never had surgery before. She was tested for genetic disorders and was positive for [facility]. Pt had a multitude of symptoms, during her recovery she was doing yard work and got lyme disease. Then she had achy joints, twitching of the face--went to a number of doctors for it. The stomach never stopped hurting, even now--although now it can be ascribed to the hernia. She went to the original surgeon last week to discuss her numerous allergies so she doesn't want the mesh but she definitely wants something to reinforce and prevent a recurrence. She mentioned that pig skin/cadaver skin is being used up at [facility].       Chief Complaint   Patient presents with    Incisional Hernia     'below belly button'       Patient Active Problem List    Diagnosis Date Noted    Incisional hernia, without obstruction or gangrene 10/02/2017    PSVT (paroxysmal supraventricular tachycardia) ([address] 07/19/2017    Chronic sinusitis 07/19/2017    Kidney stones 07/19/2017    Depression 07/19/2017    Paraganglioma ([address] 07/19/2017     Past Medical History:   Diagnosis Date    Chronic sinusitis 7/19/2017    Depression 7/19/2017    Hypercholesterolemia     Ill-defined condition     hematuria    Ill-defined condition     allergic rhinittis    Ill-defined condition     recurrent UTIs    Incisional hernia, without obstruction or gangrene 10/2/2017    Kidney stones 7/19/2017    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    [facility] MUTATION    PSVT (paroxysmal supraventricular tachycardia) ([address] 7/19/2017    Psychotic disorder anxiety    Unspecified sleep apnea     mouth guard      Past Surgical History:   Procedure Laterality Date    ABDOMEN SURGERY PROC UNLISTED  1998    benign tumor in stomach    HX CHOLECYSTECTOMY      HX HEENT  1999    sinus    HX HEENT      widom teeth      Social History   Substance Use Topics    Smoking status: Current Every Day Smoker     Packs/day: 1.00     Years: 30.00    Smokeless tobacco: Never Used    Alcohol use 2.5 oz/week     5 Glasses of wine per week      Family History   Problem Relation Age of Onset    Elevated Lipids Sister     Breast Cancer Sister 52    Cancer Sister 48     breast cancer    Elevated Lipids Sister     Cancer Sister 50     Breast cancer    Elevated Lipids Sister     Elevated Lipids Mother     Cancer Father      bladder, prostate    Diabetes Father     Elevated Lipids Sister     Elevated Lipids Sister     Breast Cancer Maternal Aunt      had it twice last 79    Breast Cancer Paternal Aunt       Current Outpatient Prescriptions   Medication Sig    hydroxychloroquine (PLAQUENIL) 200 mg tablet Take 200 mg by mouth daily.  valACYclovir (VALTREX) 500 mg tablet Take  by mouth two (2) times a day.  LORazepam (ATIVAN) 0.5 mg tablet Take  by mouth.  LACTOBACILLUS ACIDOPHILUS (ACIDOPHILUS PO) Take  by mouth daily.  cholecalciferol, vitamin D3, (VITAMIN D3) 2,000 unit tab Take 2,000 Units by mouth daily.  loratadine (CLARITIN) 10 mg tablet Take 10 mg by mouth.  fluticasone (FLONASE) 50 mcg/actuation nasal spray 2 Sprays by Both Nostrils route daily.  aspirin delayed-release 81 mg tablet Take  by mouth daily.  doxycycline (VIBRAMYCIN) 100 mg capsule Take 1 Cap by mouth two (2) times a day.  nebivolol (BYSTOLIC) 2.5 mg tablet Take  by mouth daily.  omega 3-dha-epa-fish oil (FISH OIL) 900-1,400 mg cpDR Take 1,400 mg by mouth two (2) times a day.  magnesium oxide (MAG-OX) 400 mg tablet Take 400 mg by mouth daily.     docusate sodium (COLACE) 100 mg capsule Take 100 mg by mouth every other day.  turmeric root extract 500 mg cap Take  by mouth two (2) times a day.  omeprazole (PRILOSEC) 20 mg capsule Take 20 mg by mouth daily.  levoFLOXacin (LEVAQUIN) 500 mg tablet Take 1 Tab by mouth daily.  levocetirizine (XYZAL) 5 mg tablet Take 5 mg by mouth every evening.  ALPRAZOLAM (XANAX PO) Take 0.5 mg by mouth daily as needed. No current facility-administered medications for this visit. Allergies   Allergen Reactions    Amoxicillin Hives    Ceftin [Cefuroxime Axetil] Rash    Erythromycin Rash    Keflex [Cephalexin] Hives    Sulfa (Sulfonamide Antibiotics) Hives        Review of Systems:    A comprehensive review of systems was negative except for that written in the History of Present Illness. Objective:     Visit Vitals    /80 (BP 1 Location: Right arm, BP Patient Position: Sitting)    Pulse 84    Temp 98.4 °F (36.9 °C) (Oral)    Resp 18    Ht 5' 8\" (1.727 m)    Wt 136 lb (61.7 kg)    LMP 01/13/2010    SpO2 98%    BMI 20.68 kg/m2         Physical Exam:  General appearance: alert, cooperative, no distress, appears stated age  Head: Normocephalic, without obvious abnormality, atraumatic  Lungs: clear to auscultation bilaterally  Heart: regular rate and rhythm, S1, S2 normal, no murmur, click, rub or gallop  Neurologic: Grossly normal  Abdomen: Has a 3 cm defect just to the right of the umbilicus from her midline incision. There are no masses. No organomegaly. Assessment:       ICD-10-CM ICD-9-CM    1. Incisional hernia, without obstruction or gangrene K43.2 553.21          Plan:     Repair hernia using absorbable mesh as an outpatient. Written by marcelino Ash, as dictated by Rachael Hussein MD.    Total face to face time with patient: 25 minutes. Greater than 50% of the time was spent in counseling. Signed By: Rachael Hussein MD     October 2, 2017

## 2017-10-19 NOTE — IP AVS SNAPSHOT
[address] 
[phone] Patient: Krystyna Arora 
MRN: [id number] You are allergic to the following Allergen Reactions Amoxicillin Hives Ceftin (Cefuroxime Axetil) Hives Rash Erythromycin Hives Rash Keflex (Cephalexin) Hives Morphine Itching Sulfa (Sulfonamide Antibiotics) Hives Immunizations Administered for This Admission Name Date Influenza Vaccine (Quad) PF 10/23/2017 Recent Documentation Height Weight BMI OB Status Smoking Status 1.715 m 61.2 kg 20.83 kg/m2 Postmenopausal Current Every Day Smoker Emergency Contacts Name Discharge Info Relation Home Work Mobile Jong Villa DISCHARGE CAREGIVER [3] Other Relative [6] [phone] About your hospitalization You were admitted on:  October 19, 2017 You last received care in the:  [address] SPECIAL  You were discharged on:  October 23, 2017 Unit phone number:  [phone] Why you were hospitalized Your primary diagnosis was: Incisional Hernia, Without Obstruction Or Gangrene Your diagnoses also included:  Incisional Hernia Providers Seen During Your Hospitalizations Provider Role Specialty Primary office phone Pilar Strong MD Attending Provider General Surgery [phone] Your Primary Care Physician (PCP) Primary Care Physician Office Phone Office Fax Lyla Manuela [phone] Follow-up Information Follow up With Details Comments Contact Info Aj Hernandez MD   [address] [address] 
[phone] Your Appointments Wednesday November 01, 2017  9:40 AM EDT  
POST OP 10 MIN with MD Emma Sweeney [phone] ([facility]) [address] [facility] [address] [id number] [phone] Current Discharge Medication List  
  
START taking these medications Dose & Instructions Dispensing Information Comments Morning Noon Evening Bedtime  
 oxyCODONE-acetaminophen 5-325 mg per tablet Commonly known as:  PERCOCET Your last dose was: Your next dose is:    
   
   
 Dose:  2 Tab Take 2 Tabs by mouth every four (4) hours as needed. Max Daily Amount: 12 Tabs. Quantity:  25 Tab Refills:  0 CONTINUE these medications which have NOT CHANGED Dose & Instructions Dispensing Information Comments Morning Noon Evening Bedtime ACIDOPHILUS PO Your last dose was: Your next dose is: Take  by mouth daily. Refills:  0  
     
   
   
   
  
 aspirin delayed-release 81 mg tablet Your last dose was: Your next dose is: Take  by mouth daily. Refills:  0  
     
   
   
   
  
 BYSTOLIC 2.5 mg tablet Generic drug:  nebivolol Your last dose was: Your next dose is:    
   
   
 Dose:  2.5 mg Take 2.5 mg by mouth nightly. Refills:  0 Cholecalciferol (Vitamin D3) 50,000 unit Cap Your last dose was: Your next dose is: Take  by mouth. Refills:  0  
     
   
   
   
  
 fenofibrate 160 mg tablet Commonly known as:  LOFIBRA Your last dose was: Your next dose is:    
   
   
 Dose:  160 mg Take 160 mg by mouth daily. Refills:  0  
     
   
   
   
  
 FISH -1,400 mg Cpdr  
Generic drug:  omega 3-dha-epa-fish oil Your last dose was: Your next dose is:    
   
   
 Dose:  1400 mg Take 1,400 mg by mouth two (2) times a day. Refills:  0  
     
   
   
   
  
 FLONASE 50 mcg/actuation nasal spray Generic drug:  fluticasone Your last dose was: Your next dose is:    
   
   
 Dose:  2 Spray 2 Sprays by Both Nostrils route daily as needed. Refills:  0 loratadine 10 mg tablet Commonly known as:  Laurent Sor Your last dose was: Your next dose is:    
   
   
 Dose:  10 mg Take 10 mg by mouth nightly. Refills:  0 LORazepam 0.5 mg tablet Commonly known as:  ATIVAN Your last dose was: Your next dose is:    
   
   
 Dose:  0.5 mg Take 0.5 mg by mouth every four (4) hours as needed. Refills:  0  
     
   
   
   
  
 magnesium oxide 400 mg tablet Commonly known as:  MAG-OX Your last dose was: Your next dose is:    
   
   
 Dose:  400 mg Take 400 mg by mouth daily as needed. Refills:  0  
     
   
   
   
  
 nicotine 21 mg/24 hr  
Commonly known as:  Dayana Fabian Your last dose was: Your next dose is:    
   
   
 Dose:  1 Patch 1 Patch by TransDERmal route every twenty-four (24) hours. Refills:  0  
     
   
   
   
  
 PLAQUENIL 200 mg tablet Generic drug:  hydroxychloroquine Your last dose was: Your next dose is:    
   
   
 Dose:  200 mg Take 200 mg by mouth two (2) times a day. Refills:  0  
     
   
   
   
  
 pravastatin 40 mg tablet Commonly known as:  PRAVACHOL Your last dose was: Your next dose is:    
   
   
 Dose:  40 mg Take 40 mg by mouth nightly. Refills:  0 VALTREX 500 mg tablet Generic drug:  valACYclovir Your last dose was: Your next dose is:    
   
   
 Dose:  500 mg Take 500 mg by mouth daily. Refills:  0  
     
   
   
   
  
 VITAMIN B-12 1,000 mcg tablet Generic drug:  cyanocobalamin Your last dose was: Your next dose is:    
   
   
 Dose:  1000 mcg Take 1,000 mcg by mouth daily. Refills:  0 Where to Get Your Medications Information on where to get these meds will be given to you by the nurse or doctor. ! Ask your nurse or doctor about these medications oxyCODONE-acetaminophen 5-325 mg per tablet Discharge Instructions Abdominal Hernia Repair: What to Expect at Home Your Recovery After surgery to repair your hernia, you are likely to have pain for a few days. You may also feel like you have the flu, and you may have a low fever and feel tired and nauseated. This is common. You should feel better after a few days and will probably feel much better in 7 days. For several weeks you may feel twinges or pulling in the hernia repair when you move. You may have some bruising around the area of your hernia repair. This is normal. 
This care sheet gives you a general idea about how long it will take for you to recover. But each person recovers at a different pace. Follow the steps below to get better as quickly as possible. How can you care for yourself at home? Activity · Rest when you feel tired. Getting enough sleep will help you recover. · Try to walk each day. Start by walking a little more than you did the day before. Bit by bit, increase the amount you walk. Walking boosts blood flow and helps prevent pneumonia and constipation. · If your doctor gives you an abdominal binder to wear, use it as directed. This is an elastic bandage that wraps around your belly and upper hips. It helps support your belly muscles after surgery. · Avoid strenuous activities, such as biking, jogging, weight lifting, or aerobic exercise, until your doctor says it is okay. · Avoid lifting anything that would make you strain. This may include heavy grocery bags and milk containers, a heavy briefcase or backpack, cat litter or dog food bags, a vacuum , or a child. · Ask your doctor when you can drive again. · Most people are able to return to work within 1 to 2 weeks after surgery. But if your job requires that you to do heavy lifting or strenuous activity, you may need to take 4 to 6 weeks off from work. · You may shower 24 to 48 hours after surgery, if your doctor okays it. Pat the cut (incision) dry. Do not take a bath for the first 2 weeks, or until your doctor tells you it is okay. · Ask your doctor when it is okay for you to have sex. Diet · You can eat your normal diet. If your stomach is upset, try bland, low-fat foods like plain rice, broiled chicken, toast, and yogurt. · Drink plenty of fluids (unless your doctor tells you not to). · You may notice that your bowel movements are not regular right after your surgery. This is common. Avoid constipation and straining with bowel movements. You may want to take a fiber supplement every day. If you have not had a bowel movement after a couple of days, ask your doctor about taking a mild laxative. Medicines · Your doctor will tell you if and when you can restart your medicines. He or she will also give you instructions about taking any new medicines. · If you take blood thinners, such as warfarin (Coumadin), clopidogrel (Plavix), or aspirin, be sure to talk to your doctor. He or she will tell you if and when to start taking those medicines again. Make sure that you understand exactly what your doctor wants you to do. · Be safe with medicines. Take pain medicines exactly as directed. ¨ If the doctor gave you a prescription medicine for pain, take it as prescribed. ¨ If you are not taking a prescription pain medicine, ask your doctor if you can take an over-the-counter medicine. · If your doctor prescribed antibiotics, take them as directed. Do not stop taking them just because you feel better. You need to take the full course of antibiotics. · If you think your pain medicine is making you sick to your stomach: 
¨ Take your medicine after meals (unless your doctor has told you not to). ¨ Ask your doctor for a different pain medicine. Incision care · If you have strips of tape on the cut (incision) the doctor made, leave the tape on for a week or until it falls off. Or follow your doctor's instructions for removing the tape. · If you have staples closing the cut, you will need to visit your doctor in 1 to 2 weeks to have them removed. · Wash the area daily with warm, soapy water, and pat it dry. Don't use hydrogen peroxide or alcohol, which can slow healing. You may cover the area with a gauze bandage if it weeps or rubs against clothing. Change the bandage every day. Other instructions · Hold a pillow over your incision when you cough or take deep breaths. This will support your belly and decrease your pain. · Do breathing exercises at home as instructed by your doctor. This will help prevent pneumonia. · If you had laparoscopic surgery, you may also have pain in your left shoulder. The pain usually lasts about a day or two. Follow-up care is a key part of your treatment and safety. Be sure to make and go to all appointments, and call your doctor if you are having problems. It's also a good idea to know your test results and keep a list of the medicines you take. When should you call for help? Call 911 anytime you think you may need emergency care. For example, call if: 
· You passed out (lost consciousness). · You have sudden chest pain and shortness of breath, or you cough up blood. · You have severe pain in your belly. Call your doctor now or seek immediate medical care if: 
· You are sick to your stomach and cannot keep fluids down. · You have signs of a blood clot, such as: 
¨ Pain in your calf, back of the knee, thigh, or groin. ¨ Redness and swelling in your leg or groin. · You have signs of infection, such as: 
¨ Increased pain, swelling, warmth, or redness. ¨ Red streaks leading from the incision. ¨ Pus draining from the incision. ¨ A fever. · You have trouble passing urine or stool, especially if you have mild pain or swelling in your lower belly. · Bright red blood has soaked through the bandage over your incision. Watch closely for changes in your health, and be sure to contact your doctor if: 
· Your swelling is getting worse. · Your swelling is not going down. · You still don't have a bowel movement after taking a laxative. Where can you learn more? Go to http://luis-randi.info/. Enter B577 in the search box to learn more about \"Abdominal Hernia Repair: What to Expect at Home. \" Current as of: August 9, 2016 Content Version: 11.3 © 1543-8575 startuply. Care instructions adapted under license by [employer]gen (which disclaims liability or warranty for this information). If you have questions about a medical condition or this instruction, always ask your healthcare professional. [address] any warranty or liability for your use of this information. Discharge Orders None Porphyrio Announcement We are excited to announce that we are making your provider's discharge notes available to you in Porphyrio. You will see these notes when they are completed and signed by the physician that discharged you from your recent hospital stay. If you have any questions or concerns about any information you see in Porphyrio, please call the Health Information Department where you were seen or reach out to your Primary Care Provider for more information about your plan of care. Introducing [facility]! Dear Jas Warren: 
Thank you for requesting a Porphyrio account. Our records indicate that you already have an active Porphyrio account. You can access your account anytime at [email or web address] Did you know that you can access your hospital and ER discharge instructions at any time in Porphyrio? You can also review all of your test results from your hospital stay or ER visit. Additional Information If you have questions, please visit the Frequently Asked Questions section of the MyChart website at [email or web address] Remember, MyChart is NOT to be used for urgent needs. For medical emergencies, dial 911. Now available from your iPhone and Android! General Information Please provide this summary of care documentation to your next provider. Patient Signature:  ____________________________________________________________ Date:  ____________________________________________________________  
  
[facility] Provider Signature:  ____________________________________________________________ Date:  ____________________________________________________________

## 2017-10-19 NOTE — INTERVAL H&P NOTE
H&P Update:  Lianna Carty was seen and examined. History and physical has been reviewed. The patient has been examined.  There have been no significant clinical changes since the completion of the originally dated History and Physical.    Signed By: Marco Goodrich MD     October 19, 2017 6:51 AM

## 2017-10-19 NOTE — BRIEF OP NOTE
BRIEF OPERATIVE NOTE    Date of Procedure: 10/19/2017   Preoperative Diagnosis: INCISIONAL HERNIA  Postoperative Diagnosis: INCISIONAL HERNIA    Procedure(s):  REPAIR INCISIONAL HERNIA WITH ABSORBABLE MESH AND LIVER BIOPSY  Surgeon(s) and Role:     * Livingston Runner, MD - Primary         Assistant Staff:       Surgical Staff:  Circ-1: Yanira Tidwell RN  Circ-Relief: Buzz Sawyer RN  Scrub Tech-1: Twyla Ratliffpinky  Surg Asst-1: Chelsea Valladaresvijay  Event Time In   Incision Start 1235   Incision Close [phone]     Anesthesia: General   Estimated Blood Loss: minimal  Specimens:   ID Type Source Tests Collected by Time Destination   1 : Omentum Fresh Omentum  Livingston Runner, MD 10/19/2017 1254 Pathology   2 : Hernia Sac Fresh Hernia Sac  Livingston Runner, MD 10/19/2017 1256 Pathology   3 : Liver Biopsy Fresh Liver  Livingston Runner, MD 10/19/2017 1315 Pathology   1 : Incisional Drainage Body Fluid Incision CULTURE, ANAEROBIC Livingston Runner, MD 10/19/2017 1239 Microbiology      Findings: 2 hernias 3 and 4 cm in diameter; 2 cm of an \"oiloma\" in the incision (cultured)  Complications: none  Implants:   Implant Name Type Inv.  Item Serial No.  Lot No. LRB No. Used Action   [employer]     [id number]     N/A 1 Implanted     [id number]

## 2017-10-19 NOTE — OP NOTES
[address]   [facility] [address], [address]   OP NOTE       Name:  Kristina Ho   MR#:  [id number]   :  1960   Account #:  [de-identified]    Surgery Date:  10/19/2017   Date of Adm:  10/19/2017       PREOPERATIVE DIAGNOSIS: Ventral incisional hernia. POSTOPERATIVE DIAGNOSES: Ventral incisional hernia x2 and a 2   cm area of fat necrosis or a \"oiloma\" in the incision. PROCEDURES PERFORMED: Repair of ventral incisional hernias   and needle liver biopsy. SURGEON: Venita Mccabe MD    ANESTHESIA: General.    FINDINGS: A hernia in the periumbilical region, which was known   ahead of time, was a second hernia in the mid portion of her upper   abdominal incision. Also, while opening the incision, I encountered a 2   cm pocket of fat necrosis or just plain oil in the incision. I did culture   this. SPECIMENS REMOVED: The hernia sac, portion of omentum, and   liver biopsy x3. ESTIMATED BLOOD LOSS: Minimal.    COMPLICATIONS: None. DRAINS: None. CONDITION: Good to the PACU. DESCRIPTION OF PROCEDURE: With the patient supine and   suitably anesthetized, the abdomen was prepared with ChloraPrep and   draped as a field. The incision was reopened. Just above the umbilicus, I encountered a   hernia that I could feel right there. Actually this was about 6 cm above   the umbilicus and in the area where there was the periumbilical hernia. I opened the incision through both, excised the sacs of both hernia   sacs at both hernias. I elevated the skin off the fascia circumferentially. Inside, I had to take down a lot of adhesions between the omentum   and the abdominal wall and the incision and into the hernia sacs. Additionally took down the falciform ligament for a distance of a good   5-6 cm up towards the diaphragm to be able to place the mesh for   stability sake. The piece of mesh, 20 x 20 cm of the Bio-A mesh by   Dalia Salgado was chosen. The edges were curved.  Four stabilizing sutures of   0 Vicryl were placed and then brought up through the abdominal wall   at the appropriate places using a needle to pull the sutures through. These were placed [place], [place], Nancy Reddick and Isis and [address], and the sutures were   brought up and tied with a nice tight snug fit of the Bio-A mesh. The   fascia then was closed over this with a running, looped 0 PDS   suture which was sewn from each end and then tied in the middle. The wound was irrigated copiously with saline, and then the   subcutaneous tissues were reapproximated with 2-0 Vicryl. The skin   was closed with subcuticular Monocryl followed by Dermabond. At the   termination of the procedure, all counts were correct. The patient   tolerated this well and was brought to the PACU in satisfactory   condition. ADDENDUM: I did perform a core needle biopsy using the Biopsys   needle. The entry wound through the liver was coagulated for   hemostasis. The biopsy specimens were sent for permanent sections. MD Brooks Lira / [facility]   D:  10/19/2017   15:19   T:  10/19/2017   15:58   Job #:  [id number]

## 2017-10-19 NOTE — PERIOP NOTES
TRANSFER - OUT REPORT:    Verbal report given to [facility], RN(name) on [address]  being transferred to [facility](unit) for routine post - op       Report consisted of patients Situation, Background, Assessment and   Recommendations(SBAR). Time Pre op antibiotic given:1223  Anesthesia Stop time: 7925    Information from the following report(s) SBAR, OR Summary, Intake/Output, MAR and Cardiac Rhythm SB. was reviewed with the receiving nurse. Opportunity for questions and clarification was provided. Is the patient on 02? YES       L/Min 2    Is the patient on a monitor? NO    Is the nurse transporting with the patient? NO    Surgical Waiting Area notified of patient's transfer from PACU? YES      The following personal items collected during your admission accompanied patient upon transfer:   Dental Appliance: Dental Appliances: None  Vision: Visual Aid: Glasses (to pacu in plastic bag)  Hearing Aid:    Jewelry: Jewelry: None  Clothing: Clothing: With patient  Other Valuables:  Other Valuables: Eyeglasses, With patient  Valuables sent to safe:

## 2017-10-19 NOTE — IP AVS SNAPSHOT
[address] 
[phone] Patient: Rickie Herzog 
MRN: [id number] Current Discharge Medication List  
  
START taking these medications Dose & Instructions Dispensing Information Comments Morning Noon Evening Bedtime  
 oxyCODONE-acetaminophen 5-325 mg per tablet Commonly known as:  PERCOCET Your last dose was: Your next dose is:    
   
   
 Dose:  2 Tab Take 2 Tabs by mouth every four (4) hours as needed. Max Daily Amount: 12 Tabs. Quantity:  25 Tab Refills:  0 CONTINUE these medications which have NOT CHANGED Dose & Instructions Dispensing Information Comments Morning Noon Evening Bedtime ACIDOPHILUS PO Your last dose was: Your next dose is: Take  by mouth daily. Refills:  0  
     
   
   
   
  
 aspirin delayed-release 81 mg tablet Your last dose was: Your next dose is: Take  by mouth daily. Refills:  0  
     
   
   
   
  
 BYSTOLIC 2.5 mg tablet Generic drug:  nebivolol Your last dose was: Your next dose is:    
   
   
 Dose:  2.5 mg Take 2.5 mg by mouth nightly. Refills:  0 Cholecalciferol (Vitamin D3) 50,000 unit Cap Your last dose was: Your next dose is: Take  by mouth. Refills:  0  
     
   
   
   
  
 fenofibrate 160 mg tablet Commonly known as:  LOFIBRA Your last dose was: Your next dose is:    
   
   
 Dose:  160 mg Take 160 mg by mouth daily. Refills:  0  
     
   
   
   
  
 FISH -1,400 mg Cpdr  
Generic drug:  omega 3-dha-epa-fish oil Your last dose was: Your next dose is:    
   
   
 Dose:  1400 mg Take 1,400 mg by mouth two (2) times a day. Refills:  0  
     
   
   
   
  
 FLONASE 50 mcg/actuation nasal spray Generic drug:  fluticasone Your last dose was: Your next dose is:    
   
   
 Dose:  2 Spray 2 Sprays by Both Nostrils route daily as needed. Refills:  0  
     
   
   
   
  
 loratadine 10 mg tablet Commonly known as:  Mariah Hunting Your last dose was: Your next dose is:    
   
   
 Dose:  10 mg Take 10 mg by mouth nightly. Refills:  0 LORazepam 0.5 mg tablet Commonly known as:  ATIVAN Your last dose was: Your next dose is:    
   
   
 Dose:  0.5 mg Take 0.5 mg by mouth every four (4) hours as needed. Refills:  0  
     
   
   
   
  
 magnesium oxide 400 mg tablet Commonly known as:  MAG-OX Your last dose was: Your next dose is:    
   
   
 Dose:  400 mg Take 400 mg by mouth daily as needed. Refills:  0  
     
   
   
   
  
 nicotine 21 mg/24 hr  
Commonly known as:  Sheyla Papa Your last dose was: Your next dose is:    
   
   
 Dose:  1 Patch 1 Patch by TransDERmal route every twenty-four (24) hours. Refills:  0  
     
   
   
   
  
 PLAQUENIL 200 mg tablet Generic drug:  hydroxychloroquine Your last dose was: Your next dose is:    
   
   
 Dose:  200 mg Take 200 mg by mouth two (2) times a day. Refills:  0  
     
   
   
   
  
 pravastatin 40 mg tablet Commonly known as:  PRAVACHOL Your last dose was: Your next dose is:    
   
   
 Dose:  40 mg Take 40 mg by mouth nightly. Refills:  0 VALTREX 500 mg tablet Generic drug:  valACYclovir Your last dose was: Your next dose is:    
   
   
 Dose:  500 mg Take 500 mg by mouth daily. Refills:  0  
     
   
   
   
  
 VITAMIN B-12 1,000 mcg tablet Generic drug:  cyanocobalamin Your last dose was: Your next dose is:    
   
   
 Dose:  1000 mcg Take 1,000 mcg by mouth daily. Refills:  0 Where to Get Your Medications Information on where to get these meds will be given to you by the nurse or doctor. ! Ask your nurse or doctor about these medications  
  oxyCODONE-acetaminophen 5-325 mg per tablet

## 2017-10-19 NOTE — ANESTHESIA POSTPROCEDURE EVALUATION
Post-Anesthesia Evaluation and Assessment    Patient: Nadeem Weaver MRN: [id number]  SSN: [id number]    YOB: 1960  Age: 65 y.o. Sex: female       Cardiovascular Function/Vital Signs  Visit Vitals    /72    Pulse 60    Temp 36.6 °C (97.9 °F)    Resp 12    Ht 5' 7.5\" (1.715 m)    Wt 61.2 kg (135 lb)    SpO2 95%    BMI 20.83 kg/m2       Patient is status post general anesthesia for Procedure(s):  REPAIR INCISIONAL HERNIA WITH ABSORBABLE MESH AND LIVER BIOPSY. Nausea/Vomiting: None    Postoperative hydration reviewed and adequate. Pain:  Pain Scale 1: Numeric (0 - 10) (10/19/17 1118)  Pain Intensity 1: 2 (10/19/17 1118)   Managed    Neurological Status:   Neuro (WDL): Within Defined Limits (10/19/17 1132)   At baseline    Mental Status and Level of Consciousness: Arousable    Pulmonary Status:   O2 Device: Nasal cannula (10/19/17 7)   Adequate oxygenation and airway patent    Complications related to anesthesia: None    Post-anesthesia assessment completed.  No concerns    Signed By: True Acosta DO     October 19, 2017

## 2017-10-20 PROCEDURE — 77010033678 HC OXYGEN DAILY

## 2017-10-20 PROCEDURE — 74011250637 HC RX REV CODE- 250/637: Performed by: SURGERY

## 2017-10-20 PROCEDURE — 65210000002 HC RM PRIVATE GYN

## 2017-10-20 PROCEDURE — 74011250637 HC RX REV CODE- 250/637: Performed by: NURSE PRACTITIONER

## 2017-10-20 PROCEDURE — 74011250636 HC RX REV CODE- 250/636: Performed by: SURGERY

## 2017-10-20 RX ORDER — FLUCONAZOLE 100 MG/1
150 TABLET ORAL ONCE
Status: COMPLETED | OUTPATIENT
Start: 2017-10-20 | End: 2017-10-20

## 2017-10-20 RX ADMIN — FLUCONAZOLE 150 MG: 100 TABLET ORAL at 13:16

## 2017-10-20 RX ADMIN — DIPHENHYDRAMINE HYDROCHLORIDE 12.5 MG: 50 INJECTION, SOLUTION INTRAMUSCULAR; INTRAVENOUS at 06:52

## 2017-10-20 RX ADMIN — HYDROXYCHLOROQUINE SULFATE 200 MG: 200 TABLET, FILM COATED ORAL at 10:18

## 2017-10-20 RX ADMIN — ASPIRIN 81 MG: 81 TABLET, COATED ORAL at 10:18

## 2017-10-20 RX ADMIN — VALACYCLOVIR HYDROCHLORIDE 500 MG: 500 TABLET, FILM COATED ORAL at 10:18

## 2017-10-20 RX ADMIN — DEXTROSE MONOHYDRATE, SODIUM CHLORIDE, AND POTASSIUM CHLORIDE 75 ML/HR: 50; 4.5; 1.49 INJECTION, SOLUTION INTRAVENOUS at 04:36

## 2017-10-20 RX ADMIN — DIPHENHYDRAMINE HYDROCHLORIDE 12.5 MG: 50 INJECTION, SOLUTION INTRAMUSCULAR; INTRAVENOUS at 22:54

## 2017-10-20 RX ADMIN — Medication: at 13:10

## 2017-10-20 RX ADMIN — ENOXAPARIN SODIUM 40 MG: 40 INJECTION SUBCUTANEOUS at 19:58

## 2017-10-20 RX ADMIN — Medication 10 ML: at 22:55

## 2017-10-20 RX ADMIN — ONDANSETRON 4 MG: 2 INJECTION INTRAMUSCULAR; INTRAVENOUS at 10:18

## 2017-10-20 RX ADMIN — NEBIVOLOL HYDROCHLORIDE 2.5 MG: 2.5 TABLET ORAL at 22:36

## 2017-10-20 RX ADMIN — ONDANSETRON 4 MG: 2 INJECTION INTRAMUSCULAR; INTRAVENOUS at 22:51

## 2017-10-20 NOTE — PROGRESS NOTES
Bedside and Verbal shift change report given to April, VENANCIO (oncoming nurse) by Tiffany Gunderson RN (offgoing nurse). Report included the following information SBAR, Kardex, OR Summary, Procedure Summary, Intake/Output, MAR, Accordion and Recent Results.

## 2017-10-20 NOTE — PROGRESS NOTES
Bedside shift change report given to [facility] EMILY (oncoming nurse) by Cole Cedillo (offgoing nurse). Report included the following information SBAR.

## 2017-10-20 NOTE — PROGRESS NOTES
NUTRITION       Nutrition screening referral was triggered based on results obtained during nursing admission assessment for 5# wt loss. The patient's chart was reviewed and nutrition assessment is not indicated at this time. Wt has been stable for past month with wt loss not significant. No decrease in appetite noted. Wt Readings from Last 10 Encounters:   10/19/17 61.2 kg (135 lb)   10/12/17 15.9 kg (35 lb 2 oz)   10/02/17 61.7 kg (136 lb)   09/12/17 61.7 kg (136 lb)   07/19/17 63 kg (139 lb)   05/07/15 65.8 kg (145 lb)   04/27/15 65.8 kg (145 lb)   01/13/12 66.2 kg (146 lb)   Plan to see patient for rescreen as indicated. Thank you.      Nabila Huang RD

## 2017-10-20 NOTE — PROGRESS NOTES
Surgery Progress Note    Admit Date: 10/19/2017      Subjective:     Complaints of pain. She is using her Dilaudid PCA. She was up out of bed with assistance to urinate last night. Complaints of nausea after breakfast and vaginal itching. . Denies chest pain, shortness of breath or vomiting. No flatus. Objective:     Patient Vitals for the past 8 hrs:   BP Temp Pulse Resp SpO2   10/20/17 0804 96/65 98.1 °F (36.7 °C) 69 18 94 %   10/20/17 0406 104/68 98.1 °F (36.7 °C) 68 18 95 %        10/18 1901 - 10/20 0700  In: 2120 [I.V.:2120]  Out: 10 ip  Physical Exam:    General: alert, cooperative, no distress  Cardiac: normal S1 and S2  Lungs: Normal chest wall and respirations. Clear to auscultation. Abdomen: soft, nondistended, tender at incision  Wounds:clean, dry      CBC: Lab Results   Component Value Date/Time    WBC 7.3 10/12/2017 10:34 AM    RBC 4.44 10/12/2017 10:34 AM    HGB 13.2 10/12/2017 10:34 AM    HCT 39.7 10/12/2017 10:34 AM    PLATELET 558 22/05/2628 10:34 AM     BMP: Lab Results   Component Value Date/Time    Glucose 120 10/12/2017 10:34 AM    Sodium 143 10/12/2017 10:34 AM    Potassium 3.8 10/12/2017 10:34 AM    Chloride 107 10/12/2017 10:34 AM    CO2 28 10/12/2017 10:34 AM    BUN 8 10/12/2017 10:34 AM    Creatinine 0.68 10/12/2017 10:34 AM    Calcium 9.3 10/12/2017 10:34 AM     CMP:  Lab Results   Component Value Date/Time    Glucose 120 10/12/2017 10:34 AM    Sodium 143 10/12/2017 10:34 AM    Potassium 3.8 10/12/2017 10:34 AM    Chloride 107 10/12/2017 10:34 AM    CO2 28 10/12/2017 10:34 AM    BUN 8 10/12/2017 10:34 AM    Creatinine 0.68 10/12/2017 10:34 AM    Calcium 9.3 10/12/2017 10:34 AM    Anion gap 8 10/12/2017 10:34 AM    BUN/Creatinine ratio 12 10/12/2017 10:34 AM    Alk.  phosphatase 79 07/19/2017 01:47 PM    Protein, total 7.3 07/19/2017 01:47 PM    Albumin 4.3 07/19/2017 01:47 PM    Globulin 3.0 04/27/2015 10:01 AM    A-G Ratio 1.4 07/19/2017 01:47 PM             Assessment:   Pt is POD #1 s/p Procedure(s):  REPAIR INCISIONAL HERNIA WITH ABSORBABLE MESH AND LIVER BIOPSY      Plan:   Diet: Gi lite diet  Activity: up with assistance, walk with assistance, up to chair. Pain management- continue PCA, transition to oral pain medication tomorrow. DVT prophylaxis  Diflucan ordered. Antiemetics. Further plan per Dr. Georgie Odonnell. Carlos Mayfield NP                                                                                              ATTENDING ADDENDUM  I supervised the APC and reviewed the note. We discussed the plan of care  Doing very well. Itching from the Dilaudid, but it is working well for her pain control. Will leave on the PCA for now and wean off to pills in the AM.  May very well be able to go home on Sunday.

## 2017-10-21 PROCEDURE — 74011250637 HC RX REV CODE- 250/637: Performed by: SURGERY

## 2017-10-21 PROCEDURE — 65210000002 HC RM PRIVATE GYN

## 2017-10-21 PROCEDURE — 74011250636 HC RX REV CODE- 250/636: Performed by: SURGERY

## 2017-10-21 PROCEDURE — 74011250637 HC RX REV CODE- 250/637: Performed by: NURSE PRACTITIONER

## 2017-10-21 RX ORDER — DIPHENHYDRAMINE HCL 25 MG
25 CAPSULE ORAL
Status: DISCONTINUED | OUTPATIENT
Start: 2017-10-21 | End: 2017-10-23 | Stop reason: HOSPADM

## 2017-10-21 RX ORDER — ONDANSETRON 4 MG/1
4 TABLET, ORALLY DISINTEGRATING ORAL
Status: DISCONTINUED | OUTPATIENT
Start: 2017-10-21 | End: 2017-10-23 | Stop reason: HOSPADM

## 2017-10-21 RX ORDER — AMOXICILLIN 250 MG
1 CAPSULE ORAL DAILY
Status: DISCONTINUED | OUTPATIENT
Start: 2017-10-21 | End: 2017-10-21

## 2017-10-21 RX ORDER — FLUTICASONE PROPIONATE 50 MCG
2 SPRAY, SUSPENSION (ML) NASAL
Status: DISCONTINUED | OUTPATIENT
Start: 2017-10-21 | End: 2017-10-23 | Stop reason: HOSPADM

## 2017-10-21 RX ORDER — LORAZEPAM 0.5 MG/1
0.5 TABLET ORAL
Status: DISCONTINUED | OUTPATIENT
Start: 2017-10-21 | End: 2017-10-23 | Stop reason: HOSPADM

## 2017-10-21 RX ORDER — PRAVASTATIN SODIUM 10 MG/1
40 TABLET ORAL
Status: DISCONTINUED | OUTPATIENT
Start: 2017-10-21 | End: 2017-10-23 | Stop reason: HOSPADM

## 2017-10-21 RX ORDER — OXYCODONE AND ACETAMINOPHEN 5; 325 MG/1; MG/1
2 TABLET ORAL
Status: DISCONTINUED | OUTPATIENT
Start: 2017-10-21 | End: 2017-10-23 | Stop reason: HOSPADM

## 2017-10-21 RX ORDER — GABAPENTIN 100 MG/1
100 CAPSULE ORAL 3 TIMES DAILY
Status: DISCONTINUED | OUTPATIENT
Start: 2017-10-21 | End: 2017-10-23 | Stop reason: HOSPADM

## 2017-10-21 RX ORDER — DOCUSATE SODIUM 100 MG/1
100 CAPSULE, LIQUID FILLED ORAL 2 TIMES DAILY
Status: DISCONTINUED | OUTPATIENT
Start: 2017-10-21 | End: 2017-10-23 | Stop reason: HOSPADM

## 2017-10-21 RX ORDER — LORATADINE 10 MG/1
10 TABLET ORAL
Status: DISCONTINUED | OUTPATIENT
Start: 2017-10-21 | End: 2017-10-23 | Stop reason: HOSPADM

## 2017-10-21 RX ADMIN — HYDROXYCHLOROQUINE SULFATE 200 MG: 200 TABLET, FILM COATED ORAL at 17:05

## 2017-10-21 RX ADMIN — ASPIRIN 81 MG: 81 TABLET, COATED ORAL at 09:52

## 2017-10-21 RX ADMIN — LORAZEPAM 0.5 MG: 0.5 TABLET ORAL at 12:43

## 2017-10-21 RX ADMIN — LORATADINE 10 MG: 10 TABLET ORAL at 21:21

## 2017-10-21 RX ADMIN — OXYCODONE HYDROCHLORIDE AND ACETAMINOPHEN 2 TABLET: 5; 325 TABLET ORAL at 12:38

## 2017-10-21 RX ADMIN — DIPHENHYDRAMINE HYDROCHLORIDE 12.5 MG: 50 INJECTION, SOLUTION INTRAMUSCULAR; INTRAVENOUS at 09:46

## 2017-10-21 RX ADMIN — DIPHENHYDRAMINE HYDROCHLORIDE 25 MG: 25 CAPSULE ORAL at 14:38

## 2017-10-21 RX ADMIN — ENOXAPARIN SODIUM 40 MG: 40 INJECTION SUBCUTANEOUS at 17:06

## 2017-10-21 RX ADMIN — OXYCODONE HYDROCHLORIDE AND ACETAMINOPHEN 2 TABLET: 5; 325 TABLET ORAL at 17:06

## 2017-10-21 RX ADMIN — NEBIVOLOL HYDROCHLORIDE 2.5 MG: 2.5 TABLET ORAL at 21:21

## 2017-10-21 RX ADMIN — DOCUSATE SODIUM AND SENNOSIDES 1 TABLET: 8.6; 5 TABLET, FILM COATED ORAL at 09:52

## 2017-10-21 RX ADMIN — OXYCODONE HYDROCHLORIDE AND ACETAMINOPHEN 2 TABLET: 5; 325 TABLET ORAL at 08:31

## 2017-10-21 RX ADMIN — PRAVASTATIN SODIUM 40 MG: 10 TABLET ORAL at 21:22

## 2017-10-21 RX ADMIN — GABAPENTIN 100 MG: 100 CAPSULE ORAL at 17:05

## 2017-10-21 RX ADMIN — GABAPENTIN 100 MG: 100 CAPSULE ORAL at 21:20

## 2017-10-21 RX ADMIN — DIPHENHYDRAMINE HYDROCHLORIDE 25 MG: 25 CAPSULE ORAL at 21:17

## 2017-10-21 RX ADMIN — DOCUSATE SODIUM 100 MG: 100 CAPSULE, LIQUID FILLED ORAL at 17:05

## 2017-10-21 RX ADMIN — OXYCODONE HYDROCHLORIDE AND ACETAMINOPHEN 2 TABLET: 5; 325 TABLET ORAL at 21:18

## 2017-10-21 RX ADMIN — VALACYCLOVIR HYDROCHLORIDE 500 MG: 500 TABLET, FILM COATED ORAL at 09:52

## 2017-10-21 RX ADMIN — Medication 10 ML: at 08:45

## 2017-10-21 RX ADMIN — DOCUSATE SODIUM 100 MG: 100 CAPSULE, LIQUID FILLED ORAL at 12:38

## 2017-10-21 RX ADMIN — Medication 10 ML: at 20:13

## 2017-10-21 RX ADMIN — HYDROXYCHLOROQUINE SULFATE 200 MG: 200 TABLET, FILM COATED ORAL at 09:52

## 2017-10-21 NOTE — PROGRESS NOTES
Bedside and Verbal shift change report given to Paulina Ashton RN & VENANCIO Rocha (oncoming nurse) by Brenna Saavedra RN (offgoing nurse). Report included the following information SBAR, Kardex, OR Summary, Procedure Summary, Intake/Output, MAR, Accordion and Recent Results.

## 2017-10-21 NOTE — PROGRESS NOTES
Problem: Pain  Goal: *Control of Pain  Outcome: Progressing Towards Goal  PCA pump discontinued, pt.  Hesitant to be on just PO medication for pain  Goal: *PALLIATIVE CARE:  Alleviation of Pain  Not applicable    Problem: Patient Education: Go to Patient Education Activity  Goal: Patient/Family Education  Outcome: Progressing Towards Goal  Spoke with pt. &  regarding pain medication and importance of ambulation

## 2017-10-21 NOTE — PROGRESS NOTES
General Surgery Daily Progress Note    Admit Date: 10/19/2017  Post-Operative Day: 2 Days Post-Op from Procedure(s):  REPAIR INCISIONAL HERNIA WITH ABSORBABLE MESH AND LIVER BIOPSY     Subjective:     Last 24 hrs: Pt does c/o pain, but mostly bothered by itching - on dilaudid PCA. No nausea; tolerating diet in small amts. Voiding; has been OOB and ambulating. Little flatus    Objective:     Blood pressure 118/77, pulse 76, temperature 99 °F (37.2 °C), resp. rate 18, height 5' 7.5\" (1.715 m), weight 135 lb (61.2 kg), last menstrual period 2010, SpO2 91 %. Temp (24hrs), Av.7 °F (37.1 °C), Min:98.1 °F (36.7 °C), Max:99 °F (37.2 °C)      _____________________  Physical Exam:     Alert and Oriented, x3, in no acute distress. Cardiovascular: RRR, no peripheral edema  Lungs:CTAB   Abdomen: soft w/ mild distention; lap sites intact; diminished BS      Assessment:   Principal Problem:    Incisional hernia, without obstruction or gangrene (10/2/2017)    Active Problems:    Incisional hernia (10/19/2017)            Plan: Will d/c PCA and start percocet  Cont OOB  Cont diet  dvt proph  Hopefully home tomorrow  Stool softener    Data Review:    No results for input(s): WBC, HGB, HCT, PLT, HGBEXT, HCTEXT, PLTEXT in the last 72 hours. No results for input(s): NA, K, CL, CO2, GLU, BUN, CREA, CA, MG, PHOS, ALB, TBIL, SGOT, ALT, INR in the last 72 hours. No lab exists for component: INREXT  No results for input(s): AML, LPSE in the last 72 hours.         ______________________  Medications:    Current Facility-Administered Medications   Medication Dose Route Frequency    oxyCODONE-acetaminophen (PERCOCET) 5-325 mg per tablet 2 Tab  2 Tab Oral Q4H PRN    influenza vaccine  (3 yrs+)(PF) (FLUZONE QUAD/FLUARIX QUAD) injection 0.5 mL  0.5 mL IntraMUSCular PRIOR TO DISCHARGE    aspirin delayed-release tablet 81 mg  81 mg Oral DAILY    hydroxychloroquine (PLAQUENIL) tablet 200 mg  200 mg Oral BID    nebivolol (BYSTOLIC) tablet 2.5 mg  2.5 mg Oral QHS    valACYclovir (VALTREX) tablet 500 mg  500 mg Oral DAILY    sodium chloride (NS) flush 5-10 mL  5-10 mL IntraVENous Q8H    sodium chloride (NS) flush 5-10 mL  5-10 mL IntraVENous PRN    acetaminophen (TYLENOL) tablet 650 mg  650 mg Oral Q4H PRN    ondansetron (ZOFRAN) injection 4 mg  4 mg IntraVENous Q4H PRN    diphenhydrAMINE (BENADRYL) injection 12.5 mg  12.5 mg IntraVENous Q4H PRN    enoxaparin (LOVENOX) injection 40 mg  40 mg SubCUTAneous Q24H       Peewee Smith NP  10/21/2017      ADDENDUM:  Janiya King MD  Pt seen and examined. No acute surgical issues. Pt was a bit frustrated with not being able to get IV benadryl for pruritus. Tolerating diet but no flatus or BM yet. Continue pain control. Out of bed. Encourage ambulation. Possible DC home tomorrow.

## 2017-10-21 NOTE — PROGRESS NOTES
Problem: Surgical Pathway Post-Op Day 2 through Discharge  Goal: Psychosocial  Outcome: Progressing Towards Goal  Pt. Very anxious after D/C of PCA pump. Had family member on speaker phone expressing concerns and making strong suggestions for pt. Care and treatment.   Goal: *Optimal pain control at patient's stated goal  Outcome: Progressing Towards Goal  D/C PCA pump and tolerating PO pain meds  Goal: *Demonstrates progressive activity  Outcome: Progressing Towards Goal  Up ambulating in the reyes with encouragement

## 2017-10-22 LAB
ERYTHROCYTE [DISTWIDTH] IN BLOOD BY AUTOMATED COUNT: 14.9 % (ref 11.5–14.5)
HCT VFR BLD AUTO: 35.3 % (ref 35–47)
HGB BLD-MCNC: 11.6 G/DL (ref 11.5–16)
MCH RBC QN AUTO: 30.1 PG (ref 26–34)
MCHC RBC AUTO-ENTMCNC: 32.9 G/DL (ref 30–36.5)
MCV RBC AUTO: 91.5 FL (ref 80–99)
PLATELET # BLD AUTO: 234 K/UL (ref 150–400)
RBC # BLD AUTO: 3.86 M/UL (ref 3.8–5.2)
WBC # BLD AUTO: 8.9 K/UL (ref 3.6–11)

## 2017-10-22 PROCEDURE — 74011250637 HC RX REV CODE- 250/637: Performed by: SURGERY

## 2017-10-22 PROCEDURE — 74011250637 HC RX REV CODE- 250/637: Performed by: NURSE PRACTITIONER

## 2017-10-22 PROCEDURE — 85027 COMPLETE CBC AUTOMATED: CPT | Performed by: SURGERY

## 2017-10-22 PROCEDURE — 74011250636 HC RX REV CODE- 250/636: Performed by: SURGERY

## 2017-10-22 PROCEDURE — 36415 COLL VENOUS BLD VENIPUNCTURE: CPT | Performed by: SURGERY

## 2017-10-22 PROCEDURE — 65210000002 HC RM PRIVATE GYN

## 2017-10-22 RX ORDER — MINERAL OIL
45 OIL (ML) ORAL DAILY
Status: DISCONTINUED | OUTPATIENT
Start: 2017-10-22 | End: 2017-10-23 | Stop reason: HOSPADM

## 2017-10-22 RX ADMIN — GABAPENTIN 100 MG: 100 CAPSULE ORAL at 08:33

## 2017-10-22 RX ADMIN — Medication 5 ML: at 14:00

## 2017-10-22 RX ADMIN — DIPHENHYDRAMINE HYDROCHLORIDE 25 MG: 25 CAPSULE ORAL at 14:40

## 2017-10-22 RX ADMIN — OXYCODONE HYDROCHLORIDE AND ACETAMINOPHEN 2 TABLET: 5; 325 TABLET ORAL at 08:33

## 2017-10-22 RX ADMIN — ENOXAPARIN SODIUM 40 MG: 40 INJECTION SUBCUTANEOUS at 16:20

## 2017-10-22 RX ADMIN — NEBIVOLOL HYDROCHLORIDE 2.5 MG: 2.5 TABLET ORAL at 21:11

## 2017-10-22 RX ADMIN — OXYCODONE HYDROCHLORIDE AND ACETAMINOPHEN 2 TABLET: 5; 325 TABLET ORAL at 01:19

## 2017-10-22 RX ADMIN — LORATADINE 10 MG: 10 TABLET ORAL at 21:11

## 2017-10-22 RX ADMIN — OXYCODONE HYDROCHLORIDE AND ACETAMINOPHEN 2 TABLET: 5; 325 TABLET ORAL at 12:36

## 2017-10-22 RX ADMIN — DOCUSATE SODIUM 100 MG: 100 CAPSULE, LIQUID FILLED ORAL at 19:04

## 2017-10-22 RX ADMIN — HYDROXYCHLOROQUINE SULFATE 200 MG: 200 TABLET, FILM COATED ORAL at 08:33

## 2017-10-22 RX ADMIN — GABAPENTIN 100 MG: 100 CAPSULE ORAL at 16:20

## 2017-10-22 RX ADMIN — VALACYCLOVIR HYDROCHLORIDE 500 MG: 500 TABLET, FILM COATED ORAL at 08:33

## 2017-10-22 RX ADMIN — MINERAL OIL 45 ML: 99.9 LIQUID ORAL; TOPICAL at 16:20

## 2017-10-22 RX ADMIN — LORAZEPAM 0.5 MG: 0.5 TABLET ORAL at 09:10

## 2017-10-22 RX ADMIN — OXYCODONE HYDROCHLORIDE AND ACETAMINOPHEN 2 TABLET: 5; 325 TABLET ORAL at 17:05

## 2017-10-22 RX ADMIN — OXYCODONE HYDROCHLORIDE AND ACETAMINOPHEN 2 TABLET: 5; 325 TABLET ORAL at 21:08

## 2017-10-22 RX ADMIN — PRAVASTATIN SODIUM 40 MG: 10 TABLET ORAL at 21:12

## 2017-10-22 RX ADMIN — HYDROXYCHLOROQUINE SULFATE 200 MG: 200 TABLET, FILM COATED ORAL at 19:05

## 2017-10-22 RX ADMIN — GABAPENTIN 100 MG: 100 CAPSULE ORAL at 21:10

## 2017-10-22 RX ADMIN — DOCUSATE SODIUM 100 MG: 100 CAPSULE, LIQUID FILLED ORAL at 08:33

## 2017-10-22 RX ADMIN — ASPIRIN 81 MG: 81 TABLET, COATED ORAL at 08:33

## 2017-10-22 RX ADMIN — DIPHENHYDRAMINE HYDROCHLORIDE 25 MG: 25 CAPSULE ORAL at 21:22

## 2017-10-22 RX ADMIN — DIPHENHYDRAMINE HYDROCHLORIDE 25 MG: 25 CAPSULE ORAL at 04:13

## 2017-10-22 NOTE — PROGRESS NOTES
Progress Note    Patient: Antonella Crowe MRN: [id number]  SSN: [id number]    YOB: 1960  Age: 65 y.o. Sex: female      Admit Date: 10/19/2017    3 Days Post-Op    Procedure:  Procedure(s):  REPAIR INCISIONAL HERNIA WITH ABSORBABLE MESH AND LIVER BIOPSY    Subjective:     No acute surgical issues. Pt still with significant pain and feeling a bit nauseated. No flatus or BM yet.     Objective:     Visit Vitals    /82    Pulse 75    Temp 98.6 °F (37 °C)    Resp 12    Ht 5' 7.5\" (1.715 m)    Wt 135 lb (61.2 kg)    SpO2 97%    BMI 20.83 kg/m2       Temp (24hrs), Av.6 °F (37 °C), Min:98.2 °F (36.8 °C), Max:98.9 °F (37.2 °C)        Physical Exam:    Gen:  NAD  Pulm:  Unlabored  Abd:  S/moderately distended/appropriate TTP  Wound:  C/D/I    Recent Results (from the past 24 hour(s))   CBC W/O DIFF    Collection Time: 10/22/17  4:17 AM   Result Value Ref Range    WBC 8.9 3.6 - 11.0 K/uL    RBC 3.86 3.80 - 5.20 M/uL    HGB 11.6 11.5 - 16.0 g/dL    HCT 35.3 35.0 - 47.0 %    MCV 91.5 80.0 - 99.0 FL    MCH 30.1 26.0 - 34.0 PG    MCHC 32.9 30.0 - 36.5 g/dL    RDW 14.9 (H) 11.5 - 14.5 %    PLATELET 891 284 - 039 K/uL       Assessment:     Hospital Problems  Date Reviewed: 10/19/2017          Codes Class Noted POA    Incisional hernia ICD-10-CM: K43.2  ICD-9-CM: 553.21  10/19/2017 Unknown        * (Principal)Incisional hernia, without obstruction or gangrene ICD-10-CM: K43.2  ICD-9-CM: 553.21  10/2/2017 Yes              Plan/Recommendations/Medical Decision Making:     - Regular diet  - Pain control  - Bowel regiment  - Plan DC home tomorrow  - Encourage ambulation    Signed By: Gaviota Larsen MD     2017

## 2017-10-22 NOTE — PROGRESS NOTES
Bedside and Verbal shift change report given to Ema Nguyen (oncoming nurse) by Kaila Rose (offgoing nurse). Report included the following information SBAR, Kardex, Intake/Output, Accordion and Recent Results.

## 2017-10-23 VITALS
SYSTOLIC BLOOD PRESSURE: 112 MMHG | TEMPERATURE: 98.3 F | HEART RATE: 63 BPM | DIASTOLIC BLOOD PRESSURE: 72 MMHG | BODY MASS INDEX: 20.46 KG/M2 | HEIGHT: 68 IN | RESPIRATION RATE: 18 BRPM | WEIGHT: 135 LBS | OXYGEN SATURATION: 94 %

## 2017-10-23 LAB
BACTERIA SPEC CULT: NORMAL
BACTERIA SPEC CULT: NORMAL
GRAM STN SPEC: NORMAL
GRAM STN SPEC: NORMAL
SERVICE CMNT-IMP: NORMAL
SERVICE CMNT-IMP: NORMAL

## 2017-10-23 PROCEDURE — 74011250637 HC RX REV CODE- 250/637: Performed by: SURGERY

## 2017-10-23 PROCEDURE — 90686 IIV4 VACC NO PRSV 0.5 ML IM: CPT | Performed by: SURGERY

## 2017-10-23 PROCEDURE — 74011250637 HC RX REV CODE- 250/637: Performed by: NURSE PRACTITIONER

## 2017-10-23 PROCEDURE — 90471 IMMUNIZATION ADMIN: CPT

## 2017-10-23 PROCEDURE — 74011250636 HC RX REV CODE- 250/636: Performed by: SURGERY

## 2017-10-23 RX ORDER — OXYCODONE AND ACETAMINOPHEN 5; 325 MG/1; MG/1
2 TABLET ORAL
Qty: 25 TAB | Refills: 0 | Status: SHIPPED | OUTPATIENT
Start: 2017-10-23 | End: 2017-10-26 | Stop reason: SDUPTHER

## 2017-10-23 RX ADMIN — HYDROXYCHLOROQUINE SULFATE 200 MG: 200 TABLET, FILM COATED ORAL at 09:46

## 2017-10-23 RX ADMIN — OXYCODONE HYDROCHLORIDE AND ACETAMINOPHEN 2 TABLET: 5; 325 TABLET ORAL at 09:49

## 2017-10-23 RX ADMIN — VALACYCLOVIR HYDROCHLORIDE 500 MG: 500 TABLET, FILM COATED ORAL at 09:47

## 2017-10-23 RX ADMIN — GABAPENTIN 100 MG: 100 CAPSULE ORAL at 09:47

## 2017-10-23 RX ADMIN — MINERAL OIL 45 ML: 99.9 LIQUID ORAL; TOPICAL at 09:57

## 2017-10-23 RX ADMIN — DOCUSATE SODIUM 100 MG: 100 CAPSULE, LIQUID FILLED ORAL at 09:46

## 2017-10-23 RX ADMIN — ASPIRIN 81 MG: 81 TABLET, COATED ORAL at 09:46

## 2017-10-23 RX ADMIN — OXYCODONE HYDROCHLORIDE AND ACETAMINOPHEN 2 TABLET: 5; 325 TABLET ORAL at 02:03

## 2017-10-23 RX ADMIN — DIPHENHYDRAMINE HYDROCHLORIDE 25 MG: 25 CAPSULE ORAL at 05:54

## 2017-10-23 RX ADMIN — INFLUENZA A VIRUS A/MICHIGAN/45/2015 X-275 (H1N1) ANTIGEN (FORMALDEHYDE INACTIVATED), INFLUENZA A VIRUS A/HONG KONG/4801/2014 X-263B (H3N2) ANTIGEN (FORMALDEHYDE INACTIVATED), INFLUENZA B VIRUS B/PHUKET/3073/2013 ANTIGEN (FORMALDEHYDE INACTIVATED), AND INFLUENZA B VIRUS B/BRISBANE/60/2008 ANTIGEN (FORMALDEHYDE INACTIVATED) 0.5 ML: 15; 15; 15; 15 INJECTION, SUSPENSION INTRAMUSCULAR at 10:15

## 2017-10-23 RX ADMIN — LORAZEPAM 0.5 MG: 0.5 TABLET ORAL at 09:46

## 2017-10-23 RX ADMIN — Medication 5 ML: at 06:00

## 2017-10-23 RX ADMIN — OXYCODONE HYDROCHLORIDE AND ACETAMINOPHEN 2 TABLET: 5; 325 TABLET ORAL at 05:54

## 2017-10-23 NOTE — PROGRESS NOTES
Bedside shift change report given to Philip Magana (oncoming nurse) by Laatsha Arizmendi RN (offgoing nurse). Report included the following information SBAR.

## 2017-10-23 NOTE — DISCHARGE INSTRUCTIONS
Abdominal Hernia Repair: What to Expect at Home  Your Recovery  After surgery to repair your hernia, you are likely to have pain for a few days. You may also feel like you have the flu, and you may have a low fever and feel tired and nauseated. This is common. You should feel better after a few days and will probably feel much better in 7 days. For several weeks you may feel twinges or pulling in the hernia repair when you move. You may have some bruising around the area of your hernia repair. This is normal.  This care sheet gives you a general idea about how long it will take for you to recover. But each person recovers at a different pace. Follow the steps below to get better as quickly as possible. How can you care for yourself at home? Activity  · Rest when you feel tired. Getting enough sleep will help you recover. · Try to walk each day. Start by walking a little more than you did the day before. Bit by bit, increase the amount you walk. Walking boosts blood flow and helps prevent pneumonia and constipation. · If your doctor gives you an abdominal binder to wear, use it as directed. This is an elastic bandage that wraps around your belly and upper hips. It helps support your belly muscles after surgery. · Avoid strenuous activities, such as biking, jogging, weight lifting, or aerobic exercise, until your doctor says it is okay. · Avoid lifting anything that would make you strain. This may include heavy grocery bags and milk containers, a heavy briefcase or backpack, cat litter or dog food bags, a vacuum , or a child. · Ask your doctor when you can drive again. · Most people are able to return to work within 1 to 2 weeks after surgery. But if your job requires that you to do heavy lifting or strenuous activity, you may need to take 4 to 6 weeks off from work. · You may shower 24 to 48 hours after surgery, if your doctor okays it. Pat the cut (incision) dry.  Do not take a bath for the first 2 weeks, or until your doctor tells you it is okay. · Ask your doctor when it is okay for you to have sex. Diet  · You can eat your normal diet. If your stomach is upset, try bland, low-fat foods like plain rice, broiled chicken, toast, and yogurt. · Drink plenty of fluids (unless your doctor tells you not to). · You may notice that your bowel movements are not regular right after your surgery. This is common. Avoid constipation and straining with bowel movements. You may want to take a fiber supplement every day. If you have not had a bowel movement after a couple of days, ask your doctor about taking a mild laxative. Medicines  · Your doctor will tell you if and when you can restart your medicines. He or she will also give you instructions about taking any new medicines. · If you take blood thinners, such as warfarin (Coumadin), clopidogrel (Plavix), or aspirin, be sure to talk to your doctor. He or she will tell you if and when to start taking those medicines again. Make sure that you understand exactly what your doctor wants you to do. · Be safe with medicines. Take pain medicines exactly as directed. ¨ If the doctor gave you a prescription medicine for pain, take it as prescribed. ¨ If you are not taking a prescription pain medicine, ask your doctor if you can take an over-the-counter medicine. · If your doctor prescribed antibiotics, take them as directed. Do not stop taking them just because you feel better. You need to take the full course of antibiotics. · If you think your pain medicine is making you sick to your stomach:  ¨ Take your medicine after meals (unless your doctor has told you not to). ¨ Ask your doctor for a different pain medicine. Incision care  · If you have strips of tape on the cut (incision) the doctor made, leave the tape on for a week or until it falls off. Or follow your doctor's instructions for removing the tape.   · If you have staples closing the cut, you will need to visit your doctor in 1 to 2 weeks to have them removed. · Wash the area daily with warm, soapy water, and pat it dry. Don't use hydrogen peroxide or alcohol, which can slow healing. You may cover the area with a gauze bandage if it weeps or rubs against clothing. Change the bandage every day. Other instructions  · Hold a pillow over your incision when you cough or take deep breaths. This will support your belly and decrease your pain. · Do breathing exercises at home as instructed by your doctor. This will help prevent pneumonia. · If you had laparoscopic surgery, you may also have pain in your left shoulder. The pain usually lasts about a day or two. Follow-up care is a key part of your treatment and safety. Be sure to make and go to all appointments, and call your doctor if you are having problems. It's also a good idea to know your test results and keep a list of the medicines you take. When should you call for help? Call 911 anytime you think you may need emergency care. For example, call if:  · You passed out (lost consciousness). · You have sudden chest pain and shortness of breath, or you cough up blood. · You have severe pain in your belly. Call your doctor now or seek immediate medical care if:  · You are sick to your stomach and cannot keep fluids down. · You have signs of a blood clot, such as:  ¨ Pain in your calf, back of the knee, thigh, or groin. ¨ Redness and swelling in your leg or groin. · You have signs of infection, such as:  ¨ Increased pain, swelling, warmth, or redness. ¨ Red streaks leading from the incision. ¨ Pus draining from the incision. ¨ A fever. · You have trouble passing urine or stool, especially if you have mild pain or swelling in your lower belly. · Bright red blood has soaked through the bandage over your incision. Watch closely for changes in your health, and be sure to contact your doctor if:  · Your swelling is getting worse.   · Your swelling is not going down. · You still don't have a bowel movement after taking a laxative. Where can you learn more? Go to http://luis-radni.info/. Enter B577 in the search box to learn more about \"Abdominal Hernia Repair: What to Expect at Home. \"  Current as of: August 9, 2016  Content Version: 11.3  © 4394-9045 [employer]. Care instructions adapted under license by [employer] (which disclaims liability or warranty for this information). If you have questions about a medical condition or this instruction, always ask your healthcare professional. [place] [address] any warranty or liability for your use of this information.

## 2017-10-23 NOTE — PROGRESS NOTES
General Surgery Daily Progress Note    Admit Date: 10/19/2017  Post-Operative Day: 4 Days Post-Op from Procedure(s):  REPAIR INCISIONAL HERNIA WITH ABSORBABLE MESH AND LIVER BIOPSY     Subjective:     Last 24 hrs: Pt is doing well and ready to go home. Tolerating diet and passing flatus. No BM yet. Objective:     Blood pressure 112/72, pulse 63, temperature 98.3 °F (36.8 °C), resp. rate 18, height 5' 7.5\" (1.715 m), weight 135 lb (61.2 kg), last menstrual period 2010, SpO2 94 %. Temp (24hrs), Av.9 °F (36.6 °C), Min:96.5 °F (35.8 °C), Max:98.8 °F (37.1 °C)      _____________________  Physical Exam:     Alert and Oriented, x3, in no acute distress. Cardiovascular: RRR, no peripheral edema  Abdomen: soft, incision is clean, nl BS     Assessment:   Principal Problem:    Incisional hernia, without obstruction or gangrene (10/2/2017)    Active Problems:    Incisional hernia (10/19/2017)            Plan:     D/c home  Pt requests op report for ins purposes - given  F/u  w/ Dr Kash Vizcarra    Data Review:    Recent Labs      10/22/17   0417   WBC  8.9   HGB  11.6   HCT  35.3   PLT  234     No results for input(s): NA, K, CL, CO2, GLU, BUN, CREA, CA, MG, PHOS, ALB, TBIL, SGOT, ALT, INR in the last 72 hours. No lab exists for component: INREXT  No results for input(s): AML, LPSE in the last 72 hours.         ______________________  Medications:    Current Facility-Administered Medications   Medication Dose Route Frequency    mineral oil 45 mL  45 mL Oral DAILY    oxyCODONE-acetaminophen (PERCOCET) 5-325 mg per tablet 2 Tab  2 Tab Oral Q4H PRN    diphenhydrAMINE (BENADRYL) capsule 25 mg  25 mg Oral Q6H PRN    ondansetron (ZOFRAN ODT) tablet 4 mg  4 mg Oral Q6H PRN    LORazepam (ATIVAN) tablet 0.5 mg  0.5 mg Oral Q4H PRN    pravastatin (PRAVACHOL) tablet 40 mg  40 mg Oral QHS    loratadine (CLARITIN) tablet 10 mg  10 mg Oral QHS    fluticasone (FLONASE) 50 mcg/actuation nasal spray 2 Spray  2 Spray Both Nostrils DAILY PRN    docusate sodium (COLACE) capsule 100 mg  100 mg Oral BID    gabapentin (NEURONTIN) capsule 100 mg  100 mg Oral TID    influenza vaccine 2017-18 (3 yrs+)(PF) (FLUZONE QUAD/FLUARIX QUAD) injection 0.5 mL  0.5 mL IntraMUSCular PRIOR TO DISCHARGE    aspirin delayed-release tablet 81 mg  81 mg Oral DAILY    hydroxychloroquine (PLAQUENIL) tablet 200 mg  200 mg Oral BID    nebivolol (BYSTOLIC) tablet 2.5 mg  2.5 mg Oral QHS    valACYclovir (VALTREX) tablet 500 mg  500 mg Oral DAILY    sodium chloride (NS) flush 5-10 mL  5-10 mL IntraVENous Q8H    sodium chloride (NS) flush 5-10 mL  5-10 mL IntraVENous PRN    acetaminophen (TYLENOL) tablet 650 mg  650 mg Oral Q4H PRN    ondansetron (ZOFRAN) injection 4 mg  4 mg IntraVENous Q4H PRN    diphenhydrAMINE (BENADRYL) injection 12.5 mg  12.5 mg IntraVENous Q4H PRN    enoxaparin (LOVENOX) injection 40 mg  40 mg SubCUTAneous Q24H       Yonathan Baez NP  10/23/2017

## 2017-10-23 NOTE — DISCHARGE SUMMARY
Physician Discharge Summary     Patient ID:  Sagrario Delgado  [id number]  4 y.o.  1960    Admit Date: 10/19/2017    Discharge Date: 10/23/2017    Admission Diagnoses: INCISIONAL HERNIA  Incisional hernia    Discharge Diagnoses:  Principal Problem:    Incisional hernia, without obstruction or gangrene (10/2/2017)    Active Problems:    Incisional hernia (10/19/2017)         Admission Condition: Good    Discharge Condition: Good    Last Procedure: Procedure[facility]  [facility]   Normal hospital course for this procedure. Consults: None    Disposition: home    Patient Instructions:   Current Discharge Medication List      START taking these medications    Details   oxyCODONE-acetaminophen (PERCOCET) 5-325 mg per tablet Take 2 Tabs by mouth every four (4) hours as needed. Max Daily Amount: 12 Tabs. Qty: 25 Tab, Refills: 0         CONTINUE these medications which have NOT CHANGED    Details   Cholecalciferol, Vitamin D3, 50,000 unit cap Take  by mouth.      pravastatin (PRAVACHOL) 40 mg tablet Take 40 mg by mouth nightly. fenofibrate (LOFIBRA) 160 mg tablet Take 160 mg by mouth daily. nicotine (NICODERM CQ) 21 mg/24 hr 1 Patch by TransDERmal route every twenty-four (24) hours. hydroxychloroquine (PLAQUENIL) 200 mg tablet Take 200 mg by mouth two (2) times a day. valACYclovir (VALTREX) 500 mg tablet Take 500 mg by mouth daily. LORazepam (ATIVAN) 0.5 mg tablet Take 0.5 mg by mouth every four (4) hours as needed. nebivolol (BYSTOLIC) 2.5 mg tablet Take 2.5 mg by mouth nightly. omega 3-dha-epa-fish oil (FISH OIL) 900-1,400 mg cpDR Take 1,400 mg by mouth two (2) times a day. LACTOBACILLUS ACIDOPHILUS (ACIDOPHILUS PO) Take  by mouth daily. magnesium oxide (MAG-OX) 400 mg tablet Take 400 mg by mouth daily as needed. loratadine (CLARITIN) 10 mg tablet Take 10 mg by mouth nightly.       fluticasone (FLONASE) 50 mcg/actuation nasal spray 2 Sprays by Both Nostrils route daily as needed. aspirin delayed-release 81 mg tablet Take  by mouth daily. cyanocobalamin (VITAMIN B-12) 1,000 mcg tablet Take 1,000 mcg by mouth daily. Activity: No lifting, pushing or pulling anything heavier than 20 lbs x 2 weeks. Walking as tolerated. No driving while you are taking narcotics. Diet: Regular Diet. If you have cramps or nausea, try eating smaller light meals more frequently. You may find it helpful to take an over-the-counter stool softener such as colace if you feel constipated. Wound Care: Keep clean and dry. You may shower, but no immersion in standing water (bath tubs, swimming pools) x 2 weeks. Pat area with soft towel to dry. Follow-up with Dr. Crispin Watkins on Wed., 11/1. Call office at [phone] if you need to change your appt. We are located at [address] in the [facility], [address] [place] . Signed:   Michael Higgins NP  10/23/2017  9:58 AM

## 2017-10-26 RX ORDER — OXYCODONE AND ACETAMINOPHEN 5; 325 MG/1; MG/1
2 TABLET ORAL
Qty: 25 TAB | Refills: 0 | Status: SHIPPED | OUTPATIENT
Start: 2017-10-26 | End: 2017-10-26 | Stop reason: SDUPTHER

## 2017-10-26 RX ORDER — OXYCODONE AND ACETAMINOPHEN 5; 325 MG/1; MG/1
2 TABLET ORAL
Qty: 25 TAB | Refills: 0 | Status: SHIPPED | OUTPATIENT
Start: 2017-10-26 | End: 2017-11-06 | Stop reason: SDUPTHER

## 2017-10-27 ENCOUNTER — TELEPHONE (OUTPATIENT)
Dept: SURGERY | Age: 57
End: 2017-10-27

## 2017-10-27 ENCOUNTER — OFFICE VISIT (OUTPATIENT)
Dept: SURGERY | Age: 57
End: 2017-10-27

## 2017-10-27 VITALS
TEMPERATURE: 97.9 F | HEIGHT: 68 IN | DIASTOLIC BLOOD PRESSURE: 80 MMHG | OXYGEN SATURATION: 98 % | RESPIRATION RATE: 18 BRPM | SYSTOLIC BLOOD PRESSURE: 128 MMHG | WEIGHT: 135 LBS | HEART RATE: 69 BPM | BODY MASS INDEX: 20.46 KG/M2

## 2017-10-27 DIAGNOSIS — Z09 POSTOPERATIVE EXAMINATION: Primary | ICD-10-CM

## 2017-10-27 RX ORDER — SERTRALINE HYDROCHLORIDE 50 MG/1
TABLET, FILM COATED ORAL DAILY
COMMUNITY

## 2017-10-27 RX ORDER — LEVOFLOXACIN 500 MG/1
500 TABLET, FILM COATED ORAL DAILY
Qty: 7 TAB | Refills: 0 | Status: SHIPPED | OUTPATIENT
Start: 2017-10-27 | End: 2017-11-03

## 2017-10-27 NOTE — PROGRESS NOTES
1. Have you been to the ER, urgent care clinic since your last visit? Hospitalized since your last visit? No    2. Have you seen or consulted any other health care providers outside of the [facility] since your last visit? Include any pap smears or colon screening. No     Patient C/O of incision being warm to the touch and puffy stating dermabod is wearing off and the was a spot of drainage reddish pus on her clothes.

## 2017-11-01 ENCOUNTER — OFFICE VISIT (OUTPATIENT)
Dept: SURGERY | Age: 57
End: 2017-11-01

## 2017-11-01 VITALS
WEIGHT: 135 LBS | BODY MASS INDEX: 20.46 KG/M2 | OXYGEN SATURATION: 99 % | RESPIRATION RATE: 16 BRPM | SYSTOLIC BLOOD PRESSURE: 110 MMHG | TEMPERATURE: 97 F | HEART RATE: 67 BPM | DIASTOLIC BLOOD PRESSURE: 80 MMHG | HEIGHT: 68 IN

## 2017-11-01 DIAGNOSIS — Z09 POSTOPERATIVE EXAMINATION: Primary | ICD-10-CM

## 2017-11-01 NOTE — PROGRESS NOTES
Post-Op Visit    Subjective:     Nedra Hampton is a 65 y.o.  female s/p REPAIR INCISIONAL HERNIA WITH ABSORBABLE MESH AND LIVER BIOPSY from 10/19/2017 who presents for post-op care. The pt states that she is doing fine.     Chief Complaint   Patient presents with    Surgical Follow-up       Patient Active Problem List    Diagnosis Date Noted    Incisional hernia 10/19/2017    Incisional hernia, without obstruction or gangrene 10/02/2017    PSVT (paroxysmal supraventricular tachycardia) ([address] 07/19/2017    Chronic sinusitis 07/19/2017    Kidney stones 07/19/2017    Depression 07/19/2017    Paraganglioma ([address] 07/19/2017     Past Medical History:   Diagnosis Date    Ascites     Asthma     AS A CHILD    Chronic kidney disease     CHRONIC UTI    Chronic kidney disease     2 URETERS LEFT KIDNEY    Chronic sinusitis 7/19/2017    Depression 7/19/2017    Hypercholesterolemia     Hypertension     Ill-defined condition     hematuria    Ill-defined condition     allergic rhinittis    Ill-defined condition     recurrent UTIs    Ill-defined condition     [employer] MUTATION    Incisional hernia, without obstruction or gangrene 10/2/2017    Kidney stones 7/19/2017    Liver disease     CHRONIC HEPATITIS    Lyme disease     7/2017    PAC (premature atrial contraction)     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    HX OTHER SURGICAL  02/17/2017 REMOVAL LYMPH NODES ATTACHED TO VENA CAVA      Social History   Substance Use Topics    Smoking status: Current Every Day Smoker     Packs/day: 0.25     Years: 30.00    Smokeless tobacco: Never Used    Alcohol use 4.2 oz/week     7 Glasses of wine per week      Comment: 7      Family History   Problem Relation Age of Onset    Elevated Lipids Sister     Breast Cancer Sister 52    Cancer Sister 48     breast cancer    Elevated Lipids Sister     Cancer Sister 50     Breast cancer    Elevated Lipids Sister     Elevated Lipids Mother     Cancer Father      bladder, prostate    Diabetes Father     Elevated Lipids Sister     Elevated Lipids Sister     Breast Cancer Maternal Aunt      had it twice last 79    Breast Cancer Paternal Aunt       Prior to Admission medications    Medication Sig Start Date End Date Taking? Authorizing Provider   sertraline (ZOLOFT) 50 mg tablet Take  by mouth daily. Yes Historical Provider   levoFLOXacin (LEVAQUIN) 500 mg tablet Take 1 Tab by mouth daily for 7 days. 10/27/17 11/3/17 Yes Rachel Titus MD   oxyCODONE-acetaminophen (PERCOCET) 5-325 mg per tablet Take 2 Tabs by mouth every four (4) hours as needed. Max Daily Amount: 12 Tabs. 10/26/17  Yes Rachel Titus MD   gabapentin (NEURONTIN) 100 mg capsule Take 1 Cap by mouth three (3) times daily. 10/23/17  Yes MILADY Gomez MD   Cholecalciferol, Vitamin D3, 50,000 unit cap Take  by mouth. Yes Historical Provider   cyanocobalamin (VITAMIN B-12) 1,000 mcg tablet Take 1,000 mcg by mouth daily. Yes Historical Provider   pravastatin (PRAVACHOL) 40 mg tablet Take 40 mg by mouth nightly. Yes Historical Provider   fenofibrate (LOFIBRA) 160 mg tablet Take 160 mg by mouth daily. Yes Historical Provider   nicotine (NICODERM CQ) 21 mg/24 hr 1 Patch by TransDERmal route every twenty-four (24) hours. Yes Historical Provider   hydroxychloroquine (PLAQUENIL) 200 mg tablet Take 200 mg by mouth two (2) times a day.    Yes Historical Provider   valACYclovir (VALTREX) 500 mg tablet Take 500 mg by mouth daily. Yes Historical Provider   LORazepam (ATIVAN) 0.5 mg tablet Take 0.5 mg by mouth every four (4) hours as needed. Yes Historical Provider   nebivolol (BYSTOLIC) 2.5 mg tablet Take 2.5 mg by mouth nightly. Yes Historical Provider   omega 3-dha-epa-fish oil (FISH OIL) 900-1,400 mg cpDR Take 1,400 mg by mouth two (2) times a day. Yes Historical Provider   LACTOBACILLUS ACIDOPHILUS (ACIDOPHILUS PO) Take  by mouth daily. Yes Historical Provider   magnesium oxide (MAG-OX) 400 mg tablet Take 400 mg by mouth daily as needed. Yes Historical Provider   loratadine (CLARITIN) 10 mg tablet Take 10 mg by mouth nightly. Yes Historical Provider   fluticasone (FLONASE) 50 mcg/actuation nasal spray 2 Sprays by Both Nostrils route daily as needed. Yes Historical Provider   aspirin delayed-release 81 mg tablet Take  by mouth daily. Yes Historical Provider     Allergies   Allergen Reactions    Amoxicillin Hives    Ceftin [Cefuroxime Axetil] Hives and Rash    Erythromycin Hives and Rash    Keflex [Cephalexin] Hives    Morphine Itching    Sulfa (Sulfonamide Antibiotics) Hives         Review of Systems:    A comprehensive review of systems was negative except for that written in the History of Present Illness. Objective:     Visit Vitals    /80 (BP 1 Location: Right arm, BP Patient Position: Sitting)    Pulse 67    Temp 97 °F (36.1 °C) (Oral)    Resp 16    Ht 5' 7.5\" (1.715 m)    Wt 135 lb (61.2 kg)    LMP 01/13/2010    SpO2 99%    BMI 20.83 kg/m2       Physical Exam:  General appearance: alert, cooperative, no distress, appears stated age  Head: Normocephalic, without obvious abnormality, atraumatic  Neurologic: Grossly normal  Abdomen:  Incision feels much better. Assessment:       ICD-10-CM ICD-9-CM    1. Postoperative examination Z09 V67.00        Plan:     Pt will f/u in two weeks.     Written by Reji Esposito marcelino Mary, as dictated by Meryl Cortez. Brennon Mcgee MD.    Total face to face time with patient: 13 minutes. Greater than 50% of the time was spent in counseling. Signed By: Napoleon Mcgee MD    November 1, 2017

## 2017-11-03 NOTE — PROGRESS NOTES
Having some pain in the right lower part of the incision and lateral to that. No erythema, no fever. Nevertheless, given there is mesh in the repair, will treat with Levaquibn ofr 5 days just to be on the safe side. I do not think there is an infection, however.

## 2017-11-06 ENCOUNTER — OFFICE VISIT (OUTPATIENT)
Dept: SURGERY | Age: 57
End: 2017-11-06

## 2017-11-06 ENCOUNTER — TELEPHONE (OUTPATIENT)
Dept: SURGERY | Age: 57
End: 2017-11-06

## 2017-11-06 VITALS
SYSTOLIC BLOOD PRESSURE: 100 MMHG | WEIGHT: 135 LBS | DIASTOLIC BLOOD PRESSURE: 64 MMHG | TEMPERATURE: 99.3 F | HEIGHT: 68 IN | BODY MASS INDEX: 20.46 KG/M2 | RESPIRATION RATE: 16 BRPM | HEART RATE: 79 BPM | OXYGEN SATURATION: 98 %

## 2017-11-06 DIAGNOSIS — Z09 POSTOPERATIVE EXAMINATION: Primary | ICD-10-CM

## 2017-11-06 RX ORDER — OXYCODONE AND ACETAMINOPHEN 5; 325 MG/1; MG/1
1 TABLET ORAL
Qty: 25 TAB | Refills: 0 | Status: SHIPPED | OUTPATIENT
Start: 2017-11-06 | End: 2017-11-15 | Stop reason: SDUPTHER

## 2017-11-06 NOTE — TELEPHONE ENCOUNTER
I returned patients call. She is requesting a refill of Percocet. I discussed with Jamie Prieto NP and she agreed to see patient today to discuss refill.

## 2017-11-06 NOTE — PATIENT INSTRUCTIONS
Abdominal Hernia Repair: What to Expect at Home  Your Recovery  After surgery to repair your hernia, you are likely to have pain for a few days. You may also feel like you have the flu, and you may have a low fever and feel tired and nauseated. This is common. You should feel better after a few days and will probably feel much better in 7 days. For several weeks you may feel twinges or pulling in the hernia repair when you move. You may have some bruising around the area of your hernia repair. This is normal.  This care sheet gives you a general idea about how long it will take for you to recover. But each person recovers at a different pace. Follow the steps below to get better as quickly as possible. How can you care for yourself at home? Activity  ? · Rest when you feel tired. Getting enough sleep will help you recover. ? · Try to walk each day. Start by walking a little more than you did the day before. Bit by bit, increase the amount you walk. Walking boosts blood flow and helps prevent pneumonia and constipation. ? · If your doctor gives you an abdominal binder to wear, use it as directed. This is an elastic bandage that wraps around your belly and upper hips. It helps support your belly muscles after surgery. ? · Avoid strenuous activities, such as biking, jogging, weight lifting, or aerobic exercise, until your doctor says it is okay. ? · Avoid lifting anything that would make you strain. This may include heavy grocery bags and milk containers, a heavy briefcase or backpack, cat litter or dog food bags, a vacuum , or a child. ? · Ask your doctor when you can drive again. ? · Most people are able to return to work within 1 to 2 weeks after surgery. But if your job requires that you to do heavy lifting or strenuous activity, you may need to take 4 to 6 weeks off from work. ? · You may shower 24 to 48 hours after surgery, if your doctor okays it. Pat the cut (incision) dry.  Do not take a bath for the first 2 weeks, or until your doctor tells you it is okay. ? · Ask your doctor when it is okay for you to have sex. Diet  ? · You can eat your normal diet. If your stomach is upset, try bland, low-fat foods like plain rice, broiled chicken, toast, and yogurt. ? · Drink plenty of fluids (unless your doctor tells you not to). ? · You may notice that your bowel movements are not regular right after your surgery. This is common. Avoid constipation and straining with bowel movements. You may want to take a fiber supplement every day. If you have not had a bowel movement after a couple of days, ask your doctor about taking a mild laxative. Medicines  ? · Your doctor will tell you if and when you can restart your medicines. He or she will also give you instructions about taking any new medicines. ? · If you take blood thinners, such as warfarin (Coumadin), clopidogrel (Plavix), or aspirin, be sure to talk to your doctor. He or she will tell you if and when to start taking those medicines again. Make sure that you understand exactly what your doctor wants you to do. ? · Be safe with medicines. Take pain medicines exactly as directed. ¨ If the doctor gave you a prescription medicine for pain, take it as prescribed. ¨ If you are not taking a prescription pain medicine, ask your doctor if you can take an over-the-counter medicine. ? · If your doctor prescribed antibiotics, take them as directed. Do not stop taking them just because you feel better. You need to take the full course of antibiotics. ? · If you think your pain medicine is making you sick to your stomach:  ¨ Take your medicine after meals (unless your doctor has told you not to). ¨ Ask your doctor for a different pain medicine. Incision care  ? · If you have strips of tape on the cut (incision) the doctor made, leave the tape on for a week or until it falls off. Or follow your doctor's instructions for removing the tape. ? · If you have staples closing the cut, you will need to visit your doctor in 1 to 2 weeks to have them removed. ? · Wash the area daily with warm, soapy water, and pat it dry. Don't use hydrogen peroxide or alcohol, which can slow healing. You may cover the area with a gauze bandage if it weeps or rubs against clothing. Change the bandage every day. Other instructions  ? · Hold a pillow over your incision when you cough or take deep breaths. This will support your belly and decrease your pain. ? · Do breathing exercises at home as instructed by your doctor. This will help prevent pneumonia. ? · If you had laparoscopic surgery, you may also have pain in your left shoulder. The pain usually lasts about a day or two. Follow-up care is a key part of your treatment and safety. Be sure to make and go to all appointments, and call your doctor if you are having problems. It's also a good idea to know your test results and keep a list of the medicines you take. When should you call for help? Call 911 anytime you think you may need emergency care. For example, call if:  ? · You passed out (lost consciousness). ? · You are short of breath. ?Call your doctor now or seek immediate medical care if:  ? · You are sick to your stomach and cannot drink fluids. ? · You have signs of a blood clot in your leg (called a deep vein thrombosis), such as:  ¨ Pain in your calf, back of the knee, thigh, or groin. ¨ Redness and swelling in your leg or groin. ? · You have signs of infection, such as:  ¨ Increased pain, swelling, warmth, or redness. ¨ Red streaks leading from the incision. ¨ Pus draining from the incision. ¨ A fever. ? · You cannot pass stools or gas. ? · You have pain that does not get better after you take pain medicine. ? · You have loose stitches, or your incision comes open. ? · Bright red blood has soaked through the bandage over your incision. ? Watch closely for changes in your health, and be sure to contact your doctor if you have any problems. Where can you learn more? Go to http://luis-randi.info/. Enter B577 in the search box to learn more about \"Abdominal Hernia Repair: What to Expect at Home. \"  Current as of: May 12, 2017  Content Version: 11.4  © 6844-5119 [employer]. Care instructions adapted under license by [employer] (which disclaims liability or warranty for this information). If you have questions about a medical condition or this instruction, always ask your healthcare professional. [address] any warranty or liability for your use of this information.

## 2017-11-06 NOTE — MR AVS SNAPSHOT
Visit Information Date & Time Provider Department Dept. Phone Encounter #  
 11/6/2017  3:40 PM LORY Palomo [phone] [id number] Your Appointments 11/15/2017  1:40 PM  
POST OP 10 MIN with MD Emma Madsen [phone] ([address]) Appt Note: PO;  
 [address] [facility] [address]  
[address] [place] [address] Upcoming Health Maintenance Date Due Hepatitis C Screening 1960 Pneumococcal 19-64 Medium Risk (1 of 1 - PPSV23) 3/11/1980 DTaP/Tdap/Td series (1 - Tdap) 3/11/1982 PAP AKA CERVICAL CYTOLOGY 3/11/1982 FOBT Q 1 YEAR AGE 50-75 3/11/2011 BREAST CANCER SCRN MAMMOGRAM 7/19/2018 Allergies as of 11/6/2017  Review Complete On: 11/6/2017 By: Alyssa Coker NP Severity Noted Reaction Type Reactions Amoxicillin  01/13/2012    Hives Ceftin [Cefuroxime Axetil]  01/13/2012    Hives, Rash Erythromycin  01/13/2012    Hives, Rash Keflex [Cephalexin]  01/13/2012    Hives Morphine  10/12/2017    Itching Sulfa (Sulfonamide Antibiotics)  01/13/2012    Hives Current Immunizations  Never Reviewed Name Date Influenza Vaccine (Quad) PF 10/23/2017 10:15 AM  
  
 Not reviewed this visit You Were Diagnosed With   
  
 Codes Comments Postoperative examination    -  Primary ICD-10-CM: K53 ICD-9-CM: V67.00 Vitals BP Pulse Temp Resp Height(growth percentile) Weight(growth percentile) 100/64 (BP 1 Location: Right arm, BP Patient Position: Sitting) 79 99.3 °F (37.4 °C) (Oral) 16 5' 7.5\" (1.715 m) 135 lb (61.2 kg) LMP SpO2 BMI OB Status Smoking Status 01/13/2010 98% 20.83 kg/m2 Postmenopausal Current Every Day Smoker BMI and BSA Data Body Mass Index Body Surface Area  
 20.83 kg/m 2 1.71 m 2 Preferred Pharmacy Pharmacy Name Phone [facility] [phone] Your Updated Medication List  
  
   
This list is accurate as of: 11/6/17  4:47 PM.  Always use your most recent med list.  
  
  
  
  
 ACIDOPHILUS PO Take  by mouth daily. aspirin delayed-release 81 mg tablet Take  by mouth daily. BYSTOLIC 2.5 mg tablet Generic drug:  nebivolol Take 2.5 mg by mouth nightly. Cholecalciferol (Vitamin D3) 50,000 unit Cap Take  by mouth. fenofibrate 160 mg tablet Commonly known as:  LOFIBRA Take 160 mg by mouth daily. FISH -1,400 mg Cpdr  
Generic drug:  omega 3-dha-epa-fish oil Take 1,400 mg by mouth two (2) times a day. FLONASE 50 mcg/actuation nasal spray Generic drug:  fluticasone 2 Sprays by Both Nostrils route daily as needed. gabapentin 100 mg capsule Commonly known as:  NEURONTIN Take 1 Cap by mouth three (3) times daily. loratadine 10 mg tablet Commonly known as:  Alessandro Fleeting Take 10 mg by mouth nightly. LORazepam 0.5 mg tablet Commonly known as:  ATIVAN Take 0.5 mg by mouth every four (4) hours as needed. magnesium oxide 400 mg tablet Commonly known as:  MAG-OX Take 400 mg by mouth daily as needed. nicotine 21 mg/24 hr  
Commonly known as:  NICODERM CQ  
1 Patch by TransDERmal route every twenty-four (24) hours. oxyCODONE-acetaminophen 5-325 mg per tablet Commonly known as:  PERCOCET Take 1 Tab by mouth every four (4) hours as needed. Max Daily Amount: 6 Tabs. PLAQUENIL 200 mg tablet Generic drug:  hydroxychloroquine Take 200 mg by mouth two (2) times a day. pravastatin 40 mg tablet Commonly known as:  PRAVACHOL Take 40 mg by mouth nightly. VALTREX 500 mg tablet Generic drug:  valACYclovir Take 500 mg by mouth daily. VITAMIN B-12 1,000 mcg tablet Generic drug:  cyanocobalamin Take 1,000 mcg by mouth daily. ZOLOFT 50 mg tablet Generic drug:  sertraline Take  by mouth daily. Prescriptions Printed Refills  
 oxyCODONE-acetaminophen (PERCOCET) 5-325 mg per tablet 0 Sig: Take 1 Tab by mouth every four (4) hours as needed. Max Daily Amount: 6 Tabs. Class: Print Route: Oral  
  
Patient Instructions Abdominal Hernia Repair: What to Expect at Home Your Recovery After surgery to repair your hernia, you are likely to have pain for a few days. You may also feel like you have the flu, and you may have a low fever and feel tired and nauseated. This is common. You should feel better after a few days and will probably feel much better in 7 days. For several weeks you may feel twinges or pulling in the hernia repair when you move. You may have some bruising around the area of your hernia repair. This is normal. 
This care sheet gives you a general idea about how long it will take for you to recover. But each person recovers at a different pace. Follow the steps below to get better as quickly as possible. How can you care for yourself at home? Activity ? · Rest when you feel tired. Getting enough sleep will help you recover. ? · Try to walk each day. Start by walking a little more than you did the day before. Bit by bit, increase the amount you walk. Walking boosts blood flow and helps prevent pneumonia and constipation. ? · If your doctor gives you an abdominal binder to wear, use it as directed. This is an elastic bandage that wraps around your belly and upper hips. It helps support your belly muscles after surgery. ? · Avoid strenuous activities, such as biking, jogging, weight lifting, or aerobic exercise, until your doctor says it is okay. ? · Avoid lifting anything that would make you strain.  This may include heavy grocery bags and milk containers, a heavy briefcase or backpack, cat litter or dog food bags, a vacuum , or a child. ? · Ask your doctor when you can drive again. ? · Most people are able to return to work within 1 to 2 weeks after surgery. But if your job requires that you to do heavy lifting or strenuous activity, you may need to take 4 to 6 weeks off from work. ? · You may shower 24 to 48 hours after surgery, if your doctor okays it. Pat the cut (incision) dry. Do not take a bath for the first 2 weeks, or until your doctor tells you it is okay. ? · Ask your doctor when it is okay for you to have sex. Diet ? · You can eat your normal diet. If your stomach is upset, try bland, low-fat foods like plain rice, broiled chicken, toast, and yogurt. ? · Drink plenty of fluids (unless your doctor tells you not to). ? · You may notice that your bowel movements are not regular right after your surgery. This is common. Avoid constipation and straining with bowel movements. You may want to take a fiber supplement every day. If you have not had a bowel movement after a couple of days, ask your doctor about taking a mild laxative. Medicines ? · Your doctor will tell you if and when you can restart your medicines. He or she will also give you instructions about taking any new medicines. ? · If you take blood thinners, such as warfarin (Coumadin), clopidogrel (Plavix), or aspirin, be sure to talk to your doctor. He or she will tell you if and when to start taking those medicines again. Make sure that you understand exactly what your doctor wants you to do. ? · Be safe with medicines. Take pain medicines exactly as directed. ¨ If the doctor gave you a prescription medicine for pain, take it as prescribed. ¨ If you are not taking a prescription pain medicine, ask your doctor if you can take an over-the-counter medicine. ? · If your doctor prescribed antibiotics, take them as directed. Do not stop taking them just because you feel better.  You need to take the full course of antibiotics. ? · If you think your pain medicine is making you sick to your stomach: 
¨ Take your medicine after meals (unless your doctor has told you not to). ¨ Ask your doctor for a different pain medicine. Incision care ? · If you have strips of tape on the cut (incision) the doctor made, leave the tape on for a week or until it falls off. Or follow your doctor's instructions for removing the tape. ? · If you have staples closing the cut, you will need to visit your doctor in 1 to 2 weeks to have them removed. ? · Wash the area daily with warm, soapy water, and pat it dry. Don't use hydrogen peroxide or alcohol, which can slow healing. You may cover the area with a gauze bandage if it weeps or rubs against clothing. Change the bandage every day. Other instructions ? · Hold a pillow over your incision when you cough or take deep breaths. This will support your belly and decrease your pain. ? · Do breathing exercises at home as instructed by your doctor. This will help prevent pneumonia. ? · If you had laparoscopic surgery, you may also have pain in your left shoulder. The pain usually lasts about a day or two. Follow-up care is a key part of your treatment and safety. Be sure to make and go to all appointments, and call your doctor if you are having problems. It's also a good idea to know your test results and keep a list of the medicines you take. When should you call for help? Call 911 anytime you think you may need emergency care. For example, call if: 
? · You passed out (lost consciousness). ? · You are short of breath. ?Call your doctor now or seek immediate medical care if: 
? · You are sick to your stomach and cannot drink fluids. ? · You have signs of a blood clot in your leg (called a deep vein thrombosis), such as: 
¨ Pain in your calf, back of the knee, thigh, or groin. ¨ Redness and swelling in your leg or groin. ? · You have signs of infection, such as: ¨ Increased pain, swelling, warmth, or redness. ¨ Red streaks leading from the incision. ¨ Pus draining from the incision. ¨ A fever. ? · You cannot pass stools or gas. ? · You have pain that does not get better after you take pain medicine. ? · You have loose stitches, or your incision comes open. ? · Bright red blood has soaked through the bandage over your incision. ? Watch closely for changes in your health, and be sure to contact your doctor if you have any problems. Where can you learn more? Go to http://luis-randi.info/. Enter B577 in the search box to learn more about \"Abdominal Hernia Repair: What to Expect at Home. \" Current as of: May 12, 2017 Content Version: 11.4 © 8900-3091 [employer]. Care instructions adapted under license by [employer] (which disclaims liability or warranty for this information). If you have questions about a medical condition or this instruction, always ask your healthcare professional. [address] any warranty or liability for your use of this information. Introducing [facility]! Dear Jelly Presume: 
Thank you for requesting a Folloyu account. Our records indicate that you already have an active Folloyu account. You can access your account anytime at [email or web address] Did you know that you can access your hospital and ER discharge instructions at any time in Folloyu? You can also review all of your test results from your hospital stay or ER visit. Additional Information If you have questions, please visit the Frequently Asked Questions section of the [employer]oll[employer] website at [email or web address] Remember, Folloyu is NOT to be used for urgent needs. For medical emergencies, dial 911. Now available from your iPhone and Android! Please provide this summary of care documentation to your next provider. Your primary care clinician is listed as Sarah Morales. If you have any questions after today's visit, please call [phone].

## 2017-11-06 NOTE — PROGRESS NOTES
Subjective:    Roxy Álvarez is a 65 y.o. female presents for postop care 18 days following repair of ventral incisional hernias and needle liver biopsy hernia repair by Dr. Lakisha Egan. Appetite is fair. Eating a regular diet  without difficulty. Bowel movements are  regular. Pain is not well controlled. Medication(s) being used: ibuprofen (OTC), Percocet, TENs unit, gabapentin. Pt is here todayas she needs a refill for the Percocet. She is taking half a tab to a tab every 4 hours as needed. Area of lower incision is still extremely sensitive but has improved. Denies fever, nausea, shortness of breath, chest pain, redness at incision site, vomiting and diarrhea. Finished course of levaquin. Reports low fevers with temp of 98.3F which is hot for her. Pathology : brought records from her surgery and liver biopsy in [address] [place] [address] from February 2017. Advance directive not on file      Objective:     Visit Vitals    /64 (BP 1 Location: Right arm, BP Patient Position: Sitting)    Pulse 79    Temp 99.3 °F (37.4 °C) (Oral)    Resp 16    Ht 5' 7.5\" (1.715 m)    Wt 135 lb (61.2 kg)    LMP 01/13/2010    SpO2 98%    BMI 20.83 kg/m2       General:  alert, no distress   Abdomen: soft, bowel sounds active, TTP, especially so around lower incision. TENS unit on lower incision   Incision:   healing well, no drainage, no erythema, no seroma, no swelling, no dehiscence, incisions well approximated   Heart: regular rate and rhythm, S1, S2 normal, no murmur, click, rub or gallop   Lungs: clear to auscultation bilaterally       Assessment:     Ventral hernias status post repair. Doing well postoperatively. Pain control    Plan:     1. Pt is to increase activities as tolerated, may lift 10-15 lbs if no pain  2. Follow-up keep follow up appointment for next week. 3. Wound care discussed - may take bath  4. Pain : refill Percocet, continue with ibuprofen, TENs and gabapentin  5.  Chart notes : will scan into EMR in case needed by other doctors. Per Dr. Georgie Odonnell, Dr. Gail Zarco will pull pathology slides from liver biopsy. Ms. Vicente Vega has a reminder for a \"due or due soon\" health maintenance. I have asked that she contact her primary care provider for follow-up on this health maintenance. Patient verbalized understanding and agreement.

## 2017-11-15 ENCOUNTER — OFFICE VISIT (OUTPATIENT)
Dept: SURGERY | Age: 57
End: 2017-11-15

## 2017-11-15 VITALS
RESPIRATION RATE: 16 BRPM | HEART RATE: 74 BPM | OXYGEN SATURATION: 98 % | DIASTOLIC BLOOD PRESSURE: 70 MMHG | SYSTOLIC BLOOD PRESSURE: 120 MMHG | WEIGHT: 138 LBS | HEIGHT: 68 IN | BODY MASS INDEX: 20.92 KG/M2 | TEMPERATURE: 97.9 F

## 2017-11-15 DIAGNOSIS — Z09 POSTOPERATIVE EXAMINATION: Primary | ICD-10-CM

## 2017-11-15 RX ORDER — GABAPENTIN 100 MG/1
100 CAPSULE ORAL 3 TIMES DAILY
Qty: 20 CAP | Refills: 11 | Status: SHIPPED | OUTPATIENT
Start: 2017-11-15 | End: 2017-11-16 | Stop reason: SDUPTHER

## 2017-11-15 RX ORDER — OXYCODONE AND ACETAMINOPHEN 5; 325 MG/1; MG/1
1 TABLET ORAL
Qty: 25 TAB | Refills: 0 | Status: SHIPPED | OUTPATIENT
Start: 2017-11-15

## 2017-11-15 NOTE — PROGRESS NOTES
1. Have you been to the ER, urgent care clinic since your last visit? Hospitalized since your last visit? No    2. Have you seen or consulted any other health care providers outside of the [address] [place] [address] since your last visit? Include any pap smears or colon screening. No     Patient states she is out of the gabapentin medication her PCP gave ehr as well as she only has 2 Percocet left. Patient states she is working 2 days a week and from home.

## 2017-11-15 NOTE — PROGRESS NOTES
Post-Op Visit    Subjective:     Nedra Hampton is a 65 y.o.  female s/p REPAIR INCISIONAL HERNIA WITH ABSORBABLE MESH AND LIVER BIOPSY from 10/19/2017 who presents for post-op care. The pt states that her pain is getting better but she still has a fair amount of angie-incisional pain.     Chief Complaint   Patient presents with    Surgical Follow-up       Patient Active Problem List    Diagnosis Date Noted    Incisional hernia 10/19/2017    Incisional hernia, without obstruction or gangrene 10/02/2017    PSVT (paroxysmal supraventricular tachycardia) ([address] 07/19/2017    Chronic sinusitis 07/19/2017    Kidney stones 07/19/2017    Depression 07/19/2017    Paraganglioma ([address] 07/19/2017     Past Medical History:   Diagnosis Date    Ascites     Asthma     AS A CHILD    Chronic kidney disease     CHRONIC UTI    Chronic kidney disease     2 URETERS LEFT KIDNEY    Chronic sinusitis 7/19/2017    Depression 7/19/2017    Hypercholesterolemia     Hypertension     Ill-defined condition     hematuria    Ill-defined condition     allergic rhinittis    Ill-defined condition     recurrent UTIs    Ill-defined condition     SDHC MUTATION    Incisional hernia, without obstruction or gangrene 10/2/2017    Kidney stones 7/19/2017    Liver disease     CHRONIC HEPATITIS    Lyme disease     7/2017    PAC (premature atrial contraction)     Paraganglioma (HCC) 7/19/2017    SDHC MUTATION    PSVT (paroxysmal supraventricular tachycardia) ([address] 7/19/2017    Psychotic disorder     anxiety    PVC (premature ventricular contraction)     Rheumatic fever     Unspecified sleep apnea     mouth guard    Vitamin D deficiency       Past Surgical History:   Procedure Laterality Date    ABDOMEN SURGERY PROC UNLISTED  1998    benign tumor in stomach    HX CHOLECYSTECTOMY      HX HEENT  1999    sinus    HX HEENT      widom teeth    HX HERNIA REPAIR  10/19/2017    Repair of ventral incisional hernia with needle liver [id number]. Lolis Whalen    HX OTHER SURGICAL  02/17/2017    REMOVAL LYMPH NODES ATTACHED TO VENA CAVA      Social History   Substance Use Topics    Smoking status: Current Every Day Smoker     Packs/day: 0.25     Years: 30.00    Smokeless tobacco: Never Used    Alcohol use 4.2 oz/week     7 Glasses of wine per week      Comment: 7      Family History   Problem Relation Age of Onset    Elevated Lipids Sister     Breast Cancer Sister 52    Cancer Sister 48     breast cancer    Elevated Lipids Sister     Cancer Sister 50     Breast cancer    Elevated Lipids Sister     Elevated Lipids Mother     Cancer Father      bladder, prostate    Diabetes Father     Elevated Lipids Sister     Elevated Lipids Sister     Breast Cancer Maternal Aunt      had it twice last 79    Breast Cancer Paternal Aunt       Prior to Admission medications    Medication Sig Start Date End Date Taking? Authorizing Provider   oxyCODONE-acetaminophen (PERCOCET) 5-325 mg per tablet Take 1 Tab by mouth every four (4) hours as needed. Max Daily Amount: 6 Tabs. 11/15/17  Yes Jordon Suarez MD   gabapentin (NEURONTIN) 100 mg capsule Take 1 Cap by mouth three (3) times daily. 11/15/17  Yes Jordon Suarez MD   sertraline (ZOLOFT) 50 mg tablet Take  by mouth daily. Yes Historical Provider   cyanocobalamin (VITAMIN B-12) 1,000 mcg tablet Take 1,000 mcg by mouth daily. Yes Historical Provider   pravastatin (PRAVACHOL) 40 mg tablet Take 40 mg by mouth nightly. Yes Historical Provider   fenofibrate (LOFIBRA) 160 mg tablet Take 160 mg by mouth daily. Yes Historical Provider   nicotine (NICODERM CQ) 21 mg/24 hr 1 Patch by TransDERmal route every twenty-four (24) hours. Yes Historical Provider   hydroxychloroquine (PLAQUENIL) 200 mg tablet Take 200 mg by mouth two (2) times a day. Yes Historical Provider   valACYclovir (VALTREX) 500 mg tablet Take 500 mg by mouth daily.    Yes Historical Provider   LORazepam (ATIVAN) 0.5 mg tablet Take 0.5 mg by mouth every four (4) hours as needed. Yes Historical Provider   nebivolol (BYSTOLIC) 2.5 mg tablet Take 2.5 mg by mouth nightly. Yes Historical Provider   omega 3-dha-epa-fish oil (FISH OIL) 900-1,400 mg cpDR Take 1,400 mg by mouth two (2) times a day. Yes Historical Provider   LACTOBACILLUS ACIDOPHILUS (ACIDOPHILUS PO) Take  by mouth daily. Yes Historical Provider   magnesium oxide (MAG-OX) 400 mg tablet Take 400 mg by mouth daily as needed. Yes Historical Provider   loratadine (CLARITIN) 10 mg tablet Take 10 mg by mouth nightly. Yes Historical Provider   fluticasone (FLONASE) 50 mcg/actuation nasal spray 2 Sprays by Both Nostrils route daily as needed. Yes Historical Provider   aspirin delayed-release 81 mg tablet Take  by mouth daily. Yes Historical Provider   Cholecalciferol, Vitamin D3, 50,000 unit cap Take  by mouth. Historical Provider     Allergies   Allergen Reactions    Amoxicillin Hives    Ceftin [Cefuroxime Axetil] Hives and Rash    Erythromycin Hives and Rash    Keflex [Cephalexin] Hives    Morphine Itching    Sulfa (Sulfonamide Antibiotics) Hives         Review of Systems:    A comprehensive review of systems was negative except for that written in the History of Present Illness. Objective:     Visit Vitals    /70 (BP 1 Location: Left arm, BP Patient Position: Sitting)    Pulse 74    Temp 97.9 °F (36.6 °C) (Oral)    Resp 16    Ht 5' 7.5\" (1.715 m)    Wt 138 lb (62.6 kg)    LMP 01/13/2010    SpO2 98%    BMI 21.29 kg/m2       Physical Exam:  General appearance: alert, cooperative, no distress, appears stated age  Head: Normocephalic, without obvious abnormality, atraumatic  Neurologic: Grossly normal  Abdomen: Incision is healing well. She is still very tender around the whole lower part of her incision, it is not erythematous and is not swollen. Assessment:       ICD-10-CM ICD-9-CM    1.  Postoperative examination Z09 V67.00 Plan:     Will continue on her Neurontin and nightly Percocet and I will see her back in a month. Written by marcelino Zavala, as dictated by Warden Arpita Watkins MD.    Total face to face time with patient: 15 minutes. Greater than 50% of the time was spent in counseling. Signed By: Warden Arpita Watkins MD    November 15, 2017

## 2017-11-15 NOTE — MR AVS SNAPSHOT
Visit Information Date & Time Provider Department Dept. Phone Encounter #  
 11/15/2017  1:40 PM Houston Quinn, [address] [phone] [id number] Upcoming Health Maintenance Date Due Hepatitis C Screening 1960 Pneumococcal 19-64 Medium Risk (1 of 1 - PPSV23) 3/11/1980 DTaP/Tdap/Td series (1 - Tdap) 3/11/1982 PAP AKA CERVICAL CYTOLOGY 3/11/1982 FOBT Q 1 YEAR AGE 50-75 3/11/2011 BREAST CANCER SCRN MAMMOGRAM 7/19/2018 Allergies as of 11/15/2017  Review Complete On: 11/15/2017 By: Houston Quinn MD  
  
 Severity Noted Reaction Type Reactions Amoxicillin  01/13/2012    Hives Ceftin [Cefuroxime Axetil]  01/13/2012    Hives, Rash Erythromycin  01/13/2012    Hives, Rash Keflex [Cephalexin]  01/13/2012    Hives Morphine  10/12/2017    Itching Sulfa (Sulfonamide Antibiotics)  01/13/2012    Hives Current Immunizations  Never Reviewed Name Date Influenza Vaccine (Quad) PF 10/23/2017 10:15 AM  
  
 Not reviewed this visit You Were Diagnosed With   
  
 Codes Comments Postoperative examination    -  Primary ICD-10-CM: Q48 ICD-9-CM: V67.00 Vitals BP Pulse Temp Resp Height(growth percentile) Weight(growth percentile) 120/70 (BP 1 Location: Left arm, BP Patient Position: Sitting) 74 97.9 °F (36.6 °C) (Oral) 16 5' 7.5\" (1.715 m) 138 lb (62.6 kg) LMP SpO2 BMI OB Status Smoking Status 01/13/2010 98% 21.29 kg/m2 Postmenopausal Current Every Day Smoker BMI and BSA Data Body Mass Index Body Surface Area  
 21.29 kg/m 2 1.73 m 2 Preferred Pharmacy Pharmacy Name Phone [facility] [phone] Your Updated Medication List  
  
   
This list is accurate as of: 11/15/17  2:35 PM.  Always use your most recent med list.  
  
  
  
  
 ACIDOPHILUS PO Take  by mouth daily. aspirin delayed-release 81 mg tablet Take  by mouth daily. BYSTOLIC 2.5 mg tablet Generic drug:  nebivolol Take 2.5 mg by mouth nightly. Cholecalciferol (Vitamin D3) 50,000 unit Cap Take  by mouth. fenofibrate 160 mg tablet Commonly known as:  LOFIBRA Take 160 mg by mouth daily. FISH -1,400 mg Cpdr  
Generic drug:  omega 3-dha-epa-fish oil Take 1,400 mg by mouth two (2) times a day. FLONASE 50 mcg/actuation nasal spray Generic drug:  fluticasone 2 Sprays by Both Nostrils route daily as needed. gabapentin 100 mg capsule Commonly known as:  NEURONTIN Take 1 Cap by mouth three (3) times daily. loratadine 10 mg tablet Commonly known as:  Napolean Kerr Take 10 mg by mouth nightly. LORazepam 0.5 mg tablet Commonly known as:  ATIVAN Take 0.5 mg by mouth every four (4) hours as needed. magnesium oxide 400 mg tablet Commonly known as:  MAG-OX Take 400 mg by mouth daily as needed. nicotine 21 mg/24 hr  
Commonly known as:  NICODERM CQ  
1 Patch by TransDERmal route every twenty-four (24) hours. oxyCODONE-acetaminophen 5-325 mg per tablet Commonly known as:  PERCOCET Take 1 Tab by mouth every four (4) hours as needed. Max Daily Amount: 6 Tabs. PLAQUENIL 200 mg tablet Generic drug:  hydroxychloroquine Take 200 mg by mouth two (2) times a day. pravastatin 40 mg tablet Commonly known as:  PRAVACHOL Take 40 mg by mouth nightly. VALTREX 500 mg tablet Generic drug:  valACYclovir Take 500 mg by mouth daily. VITAMIN B-12 1,000 mcg tablet Generic drug:  cyanocobalamin Take 1,000 mcg by mouth daily. ZOLOFT 50 mg tablet Generic drug:  sertraline Take  by mouth daily. Prescriptions Printed Refills  
 oxyCODONE-acetaminophen (PERCOCET) 5-325 mg per tablet 0 Sig: Take 1 Tab by mouth every four (4) hours as needed. Max Daily Amount: 6 Tabs. Class: Print Route: Oral  
  
Prescriptions Sent to Pharmacy Refills  
 gabapentin (NEURONTIN) 100 mg capsule 11 Sig: Take 1 Cap by mouth three (3) times daily. Class: Normal  
 [address] [facility] [id number] [place] Ph #: [phone] Route: Oral  
  
Introducing [facility] SERVICES! Dear Malissa Hsieh: 
Thank you for requesting a Brighter[employer] account. Our records indicate that you already have an active Brighter[employer] account. You can access your account anytime at [email or web address] Did you know that you can access your hospital and ER discharge instructions at any time in Brighter[employer]? You can also review all of your test results from your hospital stay or ER visit. Additional Information If you have questions, please visit the Frequently Asked Questions section of the Brighter[employer] website at [email or web address] Remember, Brightergy is NOT to be used for urgent needs. For medical emergencies, dial 911. Now available from your iPhone and Android! Please provide this summary of care documentation to your next provider. Your primary care clinician is listed as Renetta Betancourt. If you have any questions after today's visit, please call [phone].

## 2017-11-16 ENCOUNTER — TELEPHONE (OUTPATIENT)
Dept: SURGERY | Age: 57
End: 2017-11-16

## 2017-11-16 RX ORDER — GABAPENTIN 100 MG/1
200 CAPSULE ORAL 3 TIMES DAILY
Qty: 20 CAP | Refills: 11 | Status: SHIPPED | OUTPATIENT
Start: 2017-11-16

## 2017-11-16 NOTE — TELEPHONE ENCOUNTER
I returned patients call and she states she was taking 2 gabapentin tablets and not 1 as originally prescribed by DR. Tony Armas. She said she thought Dr. Tony Armas had told her she could take 1 or 2 three times a day. Since she was taking 2 the prescription ran out early. She states Dr. Estela Goodell wrote prescription for 1 tablet 3 times a day and she is requesting prescription be written for 2 tablets three times a day. Dr. Steinberg Falter prescription and electronically sent it to the pharmacy on file. Patient notified.

## 2017-11-22 ENCOUNTER — TELEPHONE (OUTPATIENT)
Dept: SURGERY | Age: 57
End: 2017-11-22

## 2017-11-22 NOTE — TELEPHONE ENCOUNTER
I returned patients call and she states the prescription Dr. Coral Tello wrote for Gabapentin with a quantity of #20 with 11 refills, makes her have to go to the pharmacy every 3 days since she is taking two tablets 3 times a day equalling 6 tablets a day. She would like the quantity increased. Per Dr. Coral Tello, he said at her last office visit patient had said the pharmacy would not refill her prescription from Dr. Emiliano Kemp because it was too soon since she was taking two tablets 3 times a day instead of 1 tablet three times a day as written by Dr. Migdalia Serrano, so Dr. Coral Tello said he would refill enough for her to get until she could refill Dr. Migdalia Serrano' prescription. Patient will see if pharmacy will fill Dr. Migdalia Serrano prescription with quantity of #100 and that may last her until she sees Dr. Coral Tello 12/18/17. At that time they can determine her need for future refills. Patient states she takes them for hypersensitivity nerve pain at the surgery site and surgery done in Feb. 2017 (not by Dr. Coral Tello) it took about 5 months for the hypersensitivity nerve pain to go away.

## 2017-12-20 ENCOUNTER — OFFICE VISIT (OUTPATIENT)
Dept: SURGERY | Age: 57
End: 2017-12-20

## 2017-12-20 VITALS
RESPIRATION RATE: 16 BRPM | SYSTOLIC BLOOD PRESSURE: 118 MMHG | BODY MASS INDEX: 20.61 KG/M2 | HEART RATE: 75 BPM | OXYGEN SATURATION: 98 % | DIASTOLIC BLOOD PRESSURE: 80 MMHG | TEMPERATURE: 98 F | HEIGHT: 68 IN | WEIGHT: 136 LBS

## 2017-12-20 DIAGNOSIS — Z09 POSTOPERATIVE EXAMINATION: Primary | ICD-10-CM

## 2017-12-20 NOTE — PROGRESS NOTES
Post-Op Visit    Subjective:     Mariaelena Ramirez is a 65 y.o.  female s/p REPAIR INCISIONAL HERNIA WITH ABSORBABLE MESH AND LIVER BIOPSY from 10/19/2017 who presents for post-op care. The claims that she is feeling much better. She is still experiencing a little pain in the lowest most aspect of the incision on the right side. She additionally reports that she is working 8-9 hours a day.     Chief Complaint   Patient presents with    Surgical Follow-up       Patient Active Problem List    Diagnosis Date Noted    Incisional hernia 10/19/2017    Incisional hernia, without obstruction or gangrene 10/02/2017    PSVT (paroxysmal supraventricular tachycardia) ([address] 07/19/2017    Chronic sinusitis 07/19/2017    Kidney stones 07/19/2017    Depression 07/19/2017    Paraganglioma ([address] 07/19/2017     Past Medical History:   Diagnosis Date    Ascites     Asthma     AS A CHILD    Chronic kidney disease     CHRONIC UTI    Chronic kidney disease     2 URETERS LEFT KIDNEY    Chronic sinusitis 7/19/2017    Depression 7/19/2017    Hypercholesterolemia     Hypertension     Ill-defined condition     hematuria    Ill-defined condition     allergic rhinittis    Ill-defined condition     recurrent UTIs    Ill-defined condition     SDHC MUTATION    Incisional hernia, without obstruction or gangrene 10/2/2017    Kidney stones 7/19/2017    Liver disease     CHRONIC HEPATITIS    Lyme disease     7/2017    PAC (premature atrial contraction)     Paraganglioma (HCC) 7/19/2017    SDHC MUTATION    PSVT (paroxysmal supraventricular tachycardia) ([address] 7/19/2017    Psychotic disorder     anxiety    PVC (premature ventricular contraction)     Rheumatic fever     Unspecified sleep apnea     mouth guard    Vitamin D deficiency       Past Surgical History:   Procedure Laterality Date    ABDOMEN SURGERY PROC UNLISTED  1998    benign tumor in stomach    HX CHOLECYSTECTOMY      HX HEENT  1999    sinus    HX HEENT      widom teeth    HX HERNIA REPAIR  10/19/2017    Repair of ventral incisional hernia with needle liver temslr-COC-CcDr. Thomas Odell    HX OTHER SURGICAL  02/17/2017    REMOVAL LYMPH NODES ATTACHED TO VENA CAVA      Social History   Substance Use Topics    Smoking status: Current Every Day Smoker     Packs/day: 0.25     Years: 30.00    Smokeless tobacco: Never Used    Alcohol use 4.2 oz/week     7 Glasses of wine per week      Comment: 7      Family History   Problem Relation Age of Onset    Elevated Lipids Sister     Breast Cancer Sister 52    Cancer Sister 48     breast cancer    Elevated Lipids Sister     Cancer Sister 50     Breast cancer    Elevated Lipids Sister     Elevated Lipids Mother     Cancer Father      bladder, prostate    Diabetes Father     Elevated Lipids Sister     Elevated Lipids Sister     Breast Cancer Maternal Aunt      had it twice last 79    Breast Cancer Paternal Aunt       Prior to Admission medications    Medication Sig Start Date End Date Taking? Authorizing Provider   gabapentin (NEURONTIN) 100 mg capsule Take 2 Caps by mouth three (3) times daily. 11/16/17  Yes Tomasa Gonzalez MD   oxyCODONE-acetaminophen (PERCOCET) 5-325 mg per tablet Take 1 Tab by mouth every four (4) hours as needed. Max Daily Amount: 6 Tabs. 11/15/17  Yes Tomasa Gonzalez MD   sertraline (ZOLOFT) 50 mg tablet Take  by mouth daily. Yes Historical Provider   cyanocobalamin (VITAMIN B-12) 1,000 mcg tablet Take 1,000 mcg by mouth daily. Yes Historical Provider   pravastatin (PRAVACHOL) 40 mg tablet Take 40 mg by mouth nightly. Yes Historical Provider   fenofibrate (LOFIBRA) 160 mg tablet Take 160 mg by mouth daily. Yes Historical Provider   hydroxychloroquine (PLAQUENIL) 200 mg tablet Take 200 mg by mouth two (2) times a day. Yes Historical Provider   valACYclovir (VALTREX) 500 mg tablet Take 500 mg by mouth daily.    Yes Historical Provider   LORazepam (ATIVAN) 0.5 mg tablet Take 0.5 mg by mouth every four (4) hours as needed. Yes Historical Provider   nebivolol (BYSTOLIC) 2.5 mg tablet Take 2.5 mg by mouth nightly. Yes Historical Provider   omega 3-dha-epa-fish oil (FISH OIL) 900-1,400 mg cpDR Take 1,400 mg by mouth two (2) times a day. Yes Historical Provider   LACTOBACILLUS ACIDOPHILUS (ACIDOPHILUS PO) Take  by mouth daily. Yes Historical Provider   loratadine (CLARITIN) 10 mg tablet Take 10 mg by mouth nightly. Yes Historical Provider   fluticasone (FLONASE) 50 mcg/actuation nasal spray 2 Sprays by Both Nostrils route daily as needed. Yes Historical Provider   aspirin delayed-release 81 mg tablet Take  by mouth daily. Yes Historical Provider   Cholecalciferol, Vitamin D3, 50,000 unit cap Take  by mouth. Historical Provider   nicotine (NICODERM CQ) 21 mg/24 hr 1 Patch by TransDERmal route every twenty-four (24) hours. Historical Provider   magnesium oxide (MAG-OX) 400 mg tablet Take 400 mg by mouth daily as needed. Historical Provider     Allergies   Allergen Reactions    Amoxicillin Hives    Ceftin [Cefuroxime Axetil] Hives and Rash    Erythromycin Hives and Rash    Keflex [Cephalexin] Hives    Morphine Itching    Sulfa (Sulfonamide Antibiotics) Hives         Review of Systems:    A comprehensive review of systems was negative except for that written in the History of Present Illness. Objective:     Visit Vitals    /80 (BP 1 Location: Left arm, BP Patient Position: Sitting)    Pulse 75    Temp 98 °F (36.7 °C) (Oral)    Resp 16    Ht 5' 7.5\" (1.715 m)    Wt 136 lb (61.7 kg)    LMP 01/13/2010    SpO2 98%    BMI 20.99 kg/m2       Physical Exam:  General appearance: alert, cooperative, no distress, appears stated age  Head: Normocephalic, without obvious abnormality, atraumatic  Neurologic: Grossly normal  Abdomen: Incision continues to heal well. It is softening up very nicely. There is no evidence of recurrent hernia.       Assessment: ICD-10-CM ICD-9-CM    1. Postoperative examination Z09 V67.00        Plan:     Pt will f/u PRN. Written by marcelino Oh, as dictated by Vladimir Vizcarra MD.    Total face to face time with patient: 11 minutes. Greater than 50% of the time was spent in counseling. Signed By: Vladimir Vizcarra MD    December 20, 2017

## 2017-12-20 NOTE — MR AVS SNAPSHOT
Visit Information Date & Time Provider Department Dept. Phone Encounter #  
 12/20/2017  9:20 AM Tomasa GonzalezEmma [address] [phone] [id number] Upcoming Health Maintenance Date Due Hepatitis C Screening 1960 Pneumococcal 19-64 Medium Risk (1 of 1 - PPSV23) 3/11/1980 DTaP/Tdap/Td series (1 - Tdap) 3/11/1982 PAP AKA CERVICAL CYTOLOGY 3/11/1982 FOBT Q 1 YEAR AGE 50-75 3/11/2011 Allergies as of 12/20/2017  Review Complete On: 12/20/2017 By: Tomasa Gonzalez MD  
  
 Severity Noted Reaction Type Reactions Amoxicillin  01/13/2012    Hives Ceftin [Cefuroxime Axetil]  01/13/2012    Hives, Rash Erythromycin  01/13/2012    Hives, Rash Keflex [Cephalexin]  01/13/2012    Hives Morphine  10/12/2017    Itching Sulfa (Sulfonamide Antibiotics)  01/13/2012    Hives Current Immunizations  Never Reviewed Name Date Influenza Vaccine (Quad) PF 10/23/2017 10:15 AM  
  
 Not reviewed this visit You Were Diagnosed With   
  
 Codes Comments Postoperative examination    -  Primary ICD-10-CM: M61 ICD-9-CM: V67.00 Vitals BP Pulse Temp Resp Height(growth percentile) Weight(growth percentile) 118/80 (BP 1 Location: Left arm, BP Patient Position: Sitting) 75 98 °F (36.7 °C) (Oral) 16 5' 7.5\" (1.715 m) 136 lb (61.7 kg) LMP SpO2 BMI OB Status Smoking Status 01/13/2010 98% 20.99 kg/m2 Postmenopausal Current Every Day Smoker BMI and BSA Data Body Mass Index Body Surface Area  
 20.99 kg/m 2 1.71 m 2 Preferred Pharmacy Pharmacy Name Phone [facility] [phone] Your Updated Medication List  
  
   
This list is accurate as of: 12/20/17  9:57 AM.  Always use your most recent med list.  
  
  
  
  
 ACIDOPHILUS PO Take  by mouth daily. aspirin delayed-release 81 mg tablet Take  by mouth daily. BYSTOLIC 2.5 mg tablet Generic drug:  nebivolol Take 2.5 mg by mouth nightly. Cholecalciferol (Vitamin D3) 50,000 unit Cap Take  by mouth. fenofibrate 160 mg tablet Commonly known as:  LOFIBRA Take 160 mg by mouth daily. FISH -1,400 mg Cpdr  
Generic drug:  omega 3-dha-epa-fish oil Take 1,400 mg by mouth two (2) times a day. FLONASE 50 mcg/actuation nasal spray Generic drug:  fluticasone 2 Sprays by Both Nostrils route daily as needed. gabapentin 100 mg capsule Commonly known as:  NEURONTIN Take 2 Caps by mouth three (3) times daily. loratadine 10 mg tablet Commonly known as:  Grey Blowers Take 10 mg by mouth nightly. LORazepam 0.5 mg tablet Commonly known as:  ATIVAN Take 0.5 mg by mouth every four (4) hours as needed. magnesium oxide 400 mg tablet Commonly known as:  MAG-OX Take 400 mg by mouth daily as needed. nicotine 21 mg/24 hr  
Commonly known as:  NICODERM CQ  
1 Patch by TransDERmal route every twenty-four (24) hours. oxyCODONE-acetaminophen 5-325 mg per tablet Commonly known as:  PERCOCET Take 1 Tab by mouth every four (4) hours as needed. Max Daily Amount: 6 Tabs. PLAQUENIL 200 mg tablet Generic drug:  hydroxychloroquine Take 200 mg by mouth two (2) times a day. pravastatin 40 mg tablet Commonly known as:  PRAVACHOL Take 40 mg by mouth nightly. VALTREX 500 mg tablet Generic drug:  valACYclovir Take 500 mg by mouth daily. VITAMIN B-12 1,000 mcg tablet Generic drug:  cyanocobalamin Take 1,000 mcg by mouth daily. ZOLOFT 50 mg tablet Generic drug:  sertraline Take  by mouth daily. Introducing \[facility]\"" & HEALTH SERVICES! Dear Kei Ng: 
Thank you for requesting a Spare to Share account. Our records indicate that you already have an active Spare to Share account. You can access your account anytime at [email or web address] Did you know that you can access your hospital and ER discharge instructions at any time in Good[employer]? You can also review all of your test results from your hospital stay or ER visit. Additional Information If you have questions, please visit the Frequently Asked Questions section of the Good[employer] website at [email or web address]/. Remember, Good[employer]s is NOT to be used for urgent needs. For medical emergencies, dial 911. Now available from your iPhone and Android! Please provide this summary of care documentation to your next provider. Your primary care clinician is listed as Kobi Barrera. If you have any questions after today's visit, please call [phone].

## 2017-12-20 NOTE — PROGRESS NOTES
1. Have you been to the ER, urgent care clinic since your last visit? Hospitalized since your last visit? No    2. Have you seen or consulted any other health care providers outside of the [address] [place] [address] since your last visit? Include any pap smears or colon screening.  No

## 2022-03-18 PROBLEM — N20.0 KIDNEY STONES: Status: ACTIVE | Noted: 2017-07-19

## 2022-03-18 PROBLEM — J32.9 CHRONIC SINUSITIS: Status: ACTIVE | Noted: 2017-07-19

## 2022-03-19 PROBLEM — F32.A DEPRESSION: Status: ACTIVE | Noted: 2017-07-19

## 2022-03-19 PROBLEM — I47.1 PSVT (PAROXYSMAL SUPRAVENTRICULAR TACHYCARDIA) (HCC): Status: ACTIVE | Noted: 2017-07-19

## 2022-03-20 PROBLEM — D44.7 PARAGANGLIOMA (HCC): Status: ACTIVE | Noted: 2017-07-19

## 2022-03-20 PROBLEM — K43.2 INCISIONAL HERNIA: Status: ACTIVE | Noted: 2017-10-19

## 2022-03-20 PROBLEM — K43.2 INCISIONAL HERNIA, WITHOUT OBSTRUCTION OR GANGRENE: Status: ACTIVE | Noted: 2017-10-02

## 2022-06-06 NOTE — TELEPHONE ENCOUNTER
Patient calls with C/Oincision being puffy and warm to the touch and dermabond bond'wearing off' and has a little 'pink pus' on clothes. Appt.  Made for eval.
No

## 2023-02-10 ENCOUNTER — OFFICE VISIT (OUTPATIENT)
Dept: ORTHOPEDIC SURGERY | Age: 63
End: 2023-02-10
Payer: COMMERCIAL

## 2023-02-10 VITALS — WEIGHT: 135 LBS | HEIGHT: 68 IN | BODY MASS INDEX: 20.46 KG/M2

## 2023-02-10 DIAGNOSIS — S50.02XA CONTUSION OF LEFT ELBOW, INITIAL ENCOUNTER: ICD-10-CM

## 2023-02-10 DIAGNOSIS — M19.031 ARTHRITIS OF SCAPHOID-TRAPEZIUM-TRAPEZOID JOINT OF RIGHT HAND: ICD-10-CM

## 2023-02-10 DIAGNOSIS — M67.431 GANGLION CYST OF VOLAR ASPECT OF RIGHT WRIST: ICD-10-CM

## 2023-02-10 DIAGNOSIS — M25.531 RIGHT WRIST PAIN: Primary | ICD-10-CM

## 2023-02-10 DIAGNOSIS — M18.11 ARTHRITIS OF CARPOMETACARPAL (CMC) JOINT OF RIGHT THUMB: ICD-10-CM

## 2023-02-10 DIAGNOSIS — S63.501A RIGHT WRIST SPRAIN, INITIAL ENCOUNTER: ICD-10-CM

## 2023-02-10 RX ORDER — BETAMETHASONE SODIUM PHOSPHATE AND BETAMETHASONE ACETATE 3; 3 MG/ML; MG/ML
6 INJECTION, SUSPENSION INTRA-ARTICULAR; INTRALESIONAL; INTRAMUSCULAR; SOFT TISSUE ONCE
Status: COMPLETED | OUTPATIENT
Start: 2023-02-10 | End: 2023-02-10

## 2023-02-10 RX ADMIN — BETAMETHASONE SODIUM PHOSPHATE AND BETAMETHASONE ACETATE 6 MG: 3; 3 INJECTION, SUSPENSION INTRA-ARTICULAR; INTRALESIONAL; INTRAMUSCULAR; SOFT TISSUE at 11:18

## 2023-02-10 NOTE — PROGRESS NOTES
HPI: Jazyln De Dios (: 1960) is a 65 y.o. female, patient, here for evaluation of the following chief complaint(s): Patient presents with complaint of right wrist pain. On 23, she slipped on some ice landing on her left elbow and right outstretched hand. She immediately had swelling and noticed some bruising to the wrist. She presented to [facility] where she had x-rays of the left elbow and right wrist. She reports she was told a fracture of the wrist cannot be ruled out. She was placed in a thumb spica Orthoglass splint and ultimately referred to us. Her elbow discomfort has improved. She also complains of a lump at the volar aspect of the right wrist. No numbness or tingling in the hands. No other complaints or concerns. She has a Velcro strap wrist splint for the right side. X-rays of the right wrist and left elbow imported into PACS. Updated x-rays of the right wrist obtained today to include a navicular view. Wrist Pain (Right )       Vitals:  Ht 5' 8\" (1.727 m)   Wt 135 lb (61.2 kg)   LMP 2010   BMI 20.53 kg/m²    Body mass index is 20.53 kg/m². Allergies   Allergen Reactions    Amoxicillin Hives    Ceftin [Cefuroxime Axetil] Hives and Rash    Erythromycin Hives and Rash    Keflex [Cephalexin] Hives    Morphine Itching    Sulfa (Sulfonamide Antibiotics) Hives       Current Outpatient Medications   Medication Sig    gabapentin (NEURONTIN) 100 mg capsule Take 2 Caps by mouth three (3) times daily. oxyCODONE-acetaminophen (PERCOCET) 5-325 mg per tablet Take 1 Tab by mouth every four (4) hours as needed. Max Daily Amount: 6 Tabs. sertraline (ZOLOFT) 50 mg tablet Take  by mouth daily. Cholecalciferol, Vitamin D3, 50,000 unit cap Take  by mouth.    cyanocobalamin (VITAMIN B-12) 1,000 mcg tablet Take 1,000 mcg by mouth daily. pravastatin (PRAVACHOL) 40 mg tablet Take 40 mg by mouth nightly. fenofibrate (LOFIBRA) 160 mg tablet Take 160 mg by mouth daily.     nicotine (NICODERM CQ) 21 mg/24 hr 1 Patch by TransDERmal route every twenty-four (24) hours. hydroxychloroquine (PLAQUENIL) 200 mg tablet Take 200 mg by mouth two (2) times a day. valACYclovir (VALTREX) 500 mg tablet Take 500 mg by mouth daily. LORazepam (ATIVAN) 0.5 mg tablet Take 0.5 mg by mouth every four (4) hours as needed. nebivolol (BYSTOLIC) 2.5 mg tablet Take 2.5 mg by mouth nightly. omega 3-dha-epa-fish oil (FISH OIL) 900-1,400 mg cpDR Take 1,400 mg by mouth two (2) times a day. LACTOBACILLUS ACIDOPHILUS (ACIDOPHILUS PO) Take  by mouth daily. magnesium oxide (MAG-OX) 400 mg tablet Take 400 mg by mouth daily as needed. loratadine (CLARITIN) 10 mg tablet Take 10 mg by mouth nightly. fluticasone (FLONASE) 50 mcg/actuation nasal spray 2 Sprays by Both Nostrils route daily as needed. aspirin delayed-release 81 mg tablet Take  by mouth daily.      Current Facility-Administered Medications   Medication    betamethasone (CELESTONE) injection 6 mg       Past Medical History:   Diagnosis Date    Ascites     Asthma     AS A CHILD    Chronic kidney disease     CHRONIC UTI    Chronic kidney disease     2 URETERS LEFT KIDNEY    Chronic sinusitis 7/19/2017    Depression 7/19/2017    Hypercholesterolemia     Hypertension     Ill-defined condition     hematuria    Ill-defined condition     allergic rhinittis    Ill-defined condition     recurrent UTIs    Ill-defined condition     SDHC MUTATION    Incisional hernia, without obstruction or gangrene 10/2/2017    Kidney stones 7/19/2017    Liver disease     CHRONIC HEPATITIS    Lyme disease     7/2017    PAC (premature atrial contraction)     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 Lashawn Amaya    HX OTHER SURGICAL  02/17/2017    REMOVAL LYMPH NODES ATTACHED TO VENA CAVA       Family History   Problem Relation Age of Onset    Elevated Lipids Sister     Breast Cancer Sister 52    Cancer Sister 48        breast cancer    Elevated Lipids Sister     Cancer Sister 50        Breast cancer    Elevated Lipids Sister     Elevated Lipids Mother     Cancer Father         bladder, prostate    Diabetes Father     Elevated Lipids Sister     Elevated Lipids Sister     Breast Cancer Maternal Aunt         had it twice last 79    Breast Cancer Paternal Aunt         Social History     Tobacco Use    Smoking status: Every Day     Packs/day: 0.25     Years: 30.00     Pack years: 7.50     Types: Cigarettes    Smokeless tobacco: Never   Substance Use Topics    Alcohol use: Yes     Alcohol/week: 7.0 standard drinks     Types: 7 Glasses of wine per week     Comment: 7    Drug use: No        Review of Systems    Constitutional: No fevers, chills, night sweats, excessive fatigue or weight loss. Musculoskeletal: + Right wrist pain. + Left elbow pain. Neurologic: No headache, blurred vision, and no areas of focal weakness or numbness. Normal gait. No sensory problems. Respiratory: No dyspnea on exertion, orthopnea, chest pain, cough or hemoptysis. Cardiovascular: No anginal chest pain, irregular heart beat, tachycardia, palpitations or orthopnea  Integumentary: No chronic rashes, inflammation, ulcerations, pruritus, petechiae, purpura, ecchymoses, or skin changes      Physical Exam    General: Alert, cooperative, no distress  Musculosketal: Right hand - Full range of motion. Normal sensation. No erythema, edema or warmth to the joints. No cysts. Skin intact. No ecchymosis. Right wrist - Full range of motion. Normal sensation. No erythema, edema or warmth to the joints. Tenderness to palpation at the anatomical snuff box. Negative Finkelstein's test. There is yellow-tinged ecchymosis at the dorsal and volar aspects of the wrist. Volar ganglion cyst appreciated at the radial aspect. Left elbow - Normal range of motion. Normal sensation. No erythema, edema or warmth to the joint. No fluid collection. Negative hook test. No sign of triceps tendon disruption. Mild tenderness to palpation at the medial epicondyle. No ecchymosis appreciated. Neurologic:  CNII-XII intact, Normal strength, sensation, and reflexes throughout    IMAGING: X-rays of the left elbow from [facility]  Normal osseous and joint space findings. No fracture or dislocation appreciated. XR Results (most recent):  Results from Appointment encounter on 02/10/23    XR WRIST RT AP/LAT/OBL MIN 3V    Narrative  AP, lateral and oblique views of the right wrist reveal joint space narrowing at the thumb [facility] joint consistent with osteoarthritis and mild scaphotrapeziotrapezoidal arthritis. No clear fracture appreciated. Good alignment. ASSESSMENT/PLAN:  Below is the assessment and plan developed based on review of pertinent history, physical exam, labs, studies, and medications. Right wrist pain. On 2/6/23, she slipped on some ice landing on her left elbow and right outstretched hand. She immediately had swelling and noticed some bruising to the wrist. She presented to [facility] where she had x-rays of the left elbow and right wrist. She reports she was told a fracture of the wrist cannot be ruled out. She was placed in a thumb spica Orthoglass splint and ultimately referred to us. Her elbow discomfort has improved.  She also complains of a lump at the volar aspect of the right wrist. Clinical exam is consistent with a right wrist sprain, right wrist volar ganglion cyst and left elbow contusion. Imaging does not reveal a fracture. She does have right thumb CMC joint arthritis and some STT joint arthritis, which was likely aggravated in the fall. Her clinical exam is consistent with a sprain to the right wrist and a right wrist volar ganglion cyst along with a healing contusion to the left elbow. She has a Velcro strap wrist splint for the right side which she will wear for comfort and support during activity and sleep. She would like to undergo an aspiration and steroid injection of the ganglion cyst. Aspiration/injection performed 2/10/23. Aspiration and expression resulted in approximately 0.25 cc of synovial fluid and blood. 1 cc of betamethasone was then injected. Patient consent obtained prior to the injection(s). Consent forms signed. Discussed risks/benefits of cortisone injection and patient gave verbal consent. Under sterile conditions, the right wrist volar ganglion cyst was aspirated followed by 1cc of Betamethasone injection. She tolerated the procedure well. 1. Right wrist pain  -     XR WRIST RT AP/LAT/OBL MIN 3V; Future  2. Right wrist sprain, initial encounter  3. Ganglion cyst of volar aspect of right wrist  -     ASPIRAT/INJECTION GANGLION CYST(S)  -     betamethasone (CELESTONE) injection 6 mg; 6 mg, IntraLESional, ONCE, 1 dose, On Fri 2/10/23 at 1200  4. Arthritis of carpometacarpal (CMC) joint of right thumb  5. Arthritis of gnasnmrw-afrvjqhxh-teppawlhr joint of right hand  6. Contusion of left elbow, initial encounter      Return in about 3 weeks (around 3/3/2023). Dr. Lolly Morelos was available for immediate consult during this encounter. An electronic signature was used to authenticate this note.   -- Mitch Vee PA-C

## (undated) DEVICE — SUTURE MCRYL SZ 4-0 L27IN ABSRB UD L19MM PS-2 1/2 CIR PRIM Y426H

## (undated) DEVICE — MARYLAND JAW OPEN SEALER/DIVIDER: Brand: LIGASURE

## (undated) DEVICE — DERMABOND SKIN ADH 0.7ML -- DERMABOND ADVANCED 12/BX

## (undated) DEVICE — 1200 GUARD II KIT W/5MM TUBE W/O VAC TUBE: Brand: GUARDIAN

## (undated) DEVICE — BLADE ASSEMB CLP HAIR FINE --

## (undated) DEVICE — DEVON™ KNEE AND BODY STRAP 60" X 3" (1.5 M X 7.6 CM): Brand: DEVON

## (undated) DEVICE — SUTURE PDS II SZ 0 L60IN ABSRB VLT L48MM CTX 1/2 CIR Z990G

## (undated) DEVICE — SURGICAL PROCEDURE PACK BASIN MAJ SET CUST NO CAUT

## (undated) DEVICE — INFECTION CONTROL KIT SYS

## (undated) DEVICE — STERILE POLYISOPRENE POWDER-FREE SURGICAL GLOVES WITH EMOLLIENT COATING: Brand: PROTEXIS

## (undated) DEVICE — SOLUTION IRRIG 1000ML H2O STRL BLT

## (undated) DEVICE — SOLUTION IV 1000ML 0.9% SOD CHL

## (undated) DEVICE — REM POLYHESIVE ADULT PATIENT RETURN ELECTRODE: Brand: VALLEYLAB

## (undated) DEVICE — DBD-PACK,LAPAROTOMY,2 REINFORCED GOWNS: Brand: MEDLINE

## (undated) DEVICE — SUTURE SZ 0 27IN 5/8 CIR UR-6  TAPER PT VIOLET ABSRB VICRYL J603H

## (undated) DEVICE — KENDALL SCD EXPRESS SLEEVES, KNEE LENGTH, MEDIUM: Brand: KENDALL SCD

## (undated) DEVICE — (D)SYR 10ML 1/5ML GRAD NSAF -- PKGING CHANGE USE ITEM 338027

## (undated) DEVICE — (D)PREP SKN CHLRAPRP APPL 26ML -- CONVERT TO ITEM 371833

## (undated) DEVICE — NEEDLE HYPO 25GA L1.5IN BVL ORIENTED ECLIPSE

## (undated) DEVICE — TROCAR SITE CLOSURE DEVICE: Brand: ENDO CLOSE

## (undated) DEVICE — ROCKER SWITCH PENCIL BLADE ELECTRODE, HOLSTER: Brand: EDGE

## (undated) DEVICE — TOWEL SURG W17XL27IN STD BLU COT NONFENESTRATED PREWASHED